# Patient Record
Sex: FEMALE | Race: WHITE | NOT HISPANIC OR LATINO
[De-identification: names, ages, dates, MRNs, and addresses within clinical notes are randomized per-mention and may not be internally consistent; named-entity substitution may affect disease eponyms.]

---

## 2019-09-11 PROBLEM — Z00.00 ENCOUNTER FOR PREVENTIVE HEALTH EXAMINATION: Status: ACTIVE | Noted: 2019-09-11

## 2019-10-02 ENCOUNTER — APPOINTMENT (OUTPATIENT)
Dept: PULMONOLOGY | Facility: CLINIC | Age: 49
End: 2019-10-02

## 2023-03-07 ENCOUNTER — APPOINTMENT (OUTPATIENT)
Dept: ORTHOPEDIC SURGERY | Facility: CLINIC | Age: 53
End: 2023-03-07
Payer: COMMERCIAL

## 2023-03-07 VITALS — HEIGHT: 68 IN | WEIGHT: 250 LBS | BODY MASS INDEX: 37.89 KG/M2

## 2023-03-07 DIAGNOSIS — M79.18 MYALGIA, OTHER SITE: ICD-10-CM

## 2023-03-07 DIAGNOSIS — J45.909 UNSPECIFIED ASTHMA, UNCOMPLICATED: ICD-10-CM

## 2023-03-07 DIAGNOSIS — M25.561 PAIN IN RIGHT KNEE: ICD-10-CM

## 2023-03-07 DIAGNOSIS — Z87.891 PERSONAL HISTORY OF NICOTINE DEPENDENCE: ICD-10-CM

## 2023-03-07 DIAGNOSIS — E78.00 PURE HYPERCHOLESTEROLEMIA, UNSPECIFIED: ICD-10-CM

## 2023-03-07 DIAGNOSIS — M25.562 PAIN IN LEFT KNEE: ICD-10-CM

## 2023-03-07 PROCEDURE — 20611 DRAIN/INJ JOINT/BURSA W/US: CPT | Mod: LT

## 2023-03-07 PROCEDURE — 99204 OFFICE O/P NEW MOD 45 MIN: CPT | Mod: 25

## 2023-03-07 PROCEDURE — 73080 X-RAY EXAM OF ELBOW: CPT | Mod: LT

## 2023-03-07 PROCEDURE — 73564 X-RAY EXAM KNEE 4 OR MORE: CPT | Mod: 50

## 2023-03-07 PROCEDURE — 73030 X-RAY EXAM OF SHOULDER: CPT | Mod: LT

## 2023-03-07 NOTE — PHYSICAL EXAM
[de-identified] : Neurologic: normal sensation, normal mood and affect, orientated and able to communicate\par Skin: no rash, no lesions\par Constitutional: well developed and well nourished\par Cardiovascular: extremities warm and well perfused\par Pulmonary: no respiratory distress \par \par Left Knee: Medial joint line tenderness\par Medial facet of patella tenderness\par Medial Tal's test positive \par \par Right Knee: Medial joint line tenderness\par Medial facet of patella tenderness\par Medial Tal's test positive \par \par X-Ray of the BILATERAL KNEES: 4 views: End stage tricompartmental osteoarthritis \par \par Left Elbow: Lateral epicondylitis

## 2023-03-07 NOTE — HISTORY OF PRESENT ILLNESS
[de-identified] : The patient is a 52 year old R hand dominant F who presents today complaining of bilateral knees, left shoulder.   \par Date of Injury/Onset: march 2022\par Pain:    At Rest: 9/10  \par With Activity:  9/10  \par Mechanism of injury: pt tripped forward, has been experiencing bilateral knee pain, left shoulder pain since.\par This is not a Work Related Injury being treated under Worker's Compensation. \par This is not an athletic injury occurring associated with an interscholastic or organized sports team. \par Quality of symptoms of the knees: L>R, locking, weakness, throbbing, shooting\par Quality of symptoms of the Left shoulder: radiating down the arm, sharp\par Improves with: nothing helps with the pain\par Worse with: activity, weight bearing, lifting, stairs \par Prior treatment: n/a\par Prior Imaging: [facility] radiology Xray  \par Out of work/sport: _, since _ \par School/Sport/Position/Occupation: full time [employer]\par Additional Information: uses a walker to assist with walking

## 2023-03-07 NOTE — DISCUSSION/SUMMARY
[de-identified] : Physical therapy prescribed for strengthening and stretching for knees + elbow\par Voltaren gel prescribed for elbow pain\par \par Bilateral Knee Ultrasound Guided aspiration/steroid injection procedure note:\par Patient Identification\par Name/: Verbal with patient and/or family\par Procedure Verification:\par Procedure confirmed with patient or family/designee\par Consent for procedure: Verbal Consent Given\par Relevant documentation completed, reviewed, and signed\par Clinical indications for procedure confirmed\par Time-out with all members of procedure team immediately prior to procedure:\par Correct patient identified. Agreement on procedure. Correct side and site.\par --\par KNEE INTRAARTICULAR INJECTION - BILATERAL\par After verbal consent and identification of the correct patient and correct site, the BILATERAL superolateral knees were prepped using alcohol swabs and betadine and were allowed time to air dry.  \par A mixture of Kenalog and Bupivacaine was injected into the BILATERAL knees using sterile 22G 1.5 inch needles.\par The patient tolerated the procedure well.  A sterile dressing was placed on each knee.  \par After-care instructions were provided and included instructions to ice the areas and to call if redness, pain, or fever develop. \par \par -----------------------------------------------\par Home Exercise\par The patient is instructed on a home exercise program.\par \par CARSON MARTINEZ Acting as a Scribe for Dr. Jenkins\par I, Carson Martinez, attest that this documentation has been prepared under the direction and in the presence of Provider Miguel Angel Jenkins MD.\par \par Activity Modification\par The patient was advised to modify their activities.\par \par Dx / Natural History\par The patient was advised of the diagnosis.  The natural history of the pathology was explained in full to the patient in layman's terms.  Several different treatment options were discussed and explained in full to the patient including the risks and benefits of both surgical and non-surgical treatments.  All questions and concerns were answered.\par \par Pain Guide Activities\par The patient was advised to let pain guide the gradual advancement of activities.\par \par RICE\par I explained to the patient that rest, ice, compression, and elevation would benefit them.  They may return to activity after follow-up or when they no longer have any pain.

## 2023-04-27 ENCOUNTER — APPOINTMENT (OUTPATIENT)
Dept: ORTHOPEDIC SURGERY | Facility: CLINIC | Age: 53
End: 2023-04-27
Payer: COMMERCIAL

## 2023-04-27 DIAGNOSIS — M17.12 UNILATERAL PRIMARY OSTEOARTHRITIS, LEFT KNEE: ICD-10-CM

## 2023-04-27 DIAGNOSIS — M17.11 UNILATERAL PRIMARY OSTEOARTHRITIS, RIGHT KNEE: ICD-10-CM

## 2023-04-27 PROCEDURE — 99215 OFFICE O/P EST HI 40 MIN: CPT

## 2023-05-26 ENCOUNTER — RESULT REVIEW (OUTPATIENT)
Age: 53
End: 2023-05-26

## 2023-06-04 ENCOUNTER — FORM ENCOUNTER (OUTPATIENT)
Age: 53
End: 2023-06-04

## 2023-06-19 NOTE — PHYSICAL EXAM
[Normal Sensation] : normal sensation [Normal Mood and Affect] : normal mood and affect [Orientated] : orientated [Able to Communicate] : able to communicate [Well Developed] : well developed [Well Nourished] : well nourished [NL (140)] : flexion 140 degrees [NL (0)] : extension 0 degrees [5___] : hamstring 5[unfilled]/5 [] : ambulation with cane [Bilateral] : knee bilaterally [advanced tricompartmental OA with medial compartment narrowing and varus alignment] : advanced tricompartmental OA with medial compartment narrowing and varus alignment [FreeTextEntry9] : bone on bone in medial compartment, subchondral sclerosis and cysts

## 2023-06-19 NOTE — ASSESSMENT
[FreeTextEntry1] : 52F p/w garrick knee OA\par \par She would like to proceed with L TKA, r b a explained to patient, we will to schedule, preop CT to eval bone loss, we discussed strategies for weight loss prior to surgery\par \par We discussed my findings and the natural history of their condition. We talked about the details of the proposed surgery and the recovery. We discussed the material risks, possible benefits and alternatives to surgery. The risks include but are not limited to infection, bleeding and possible need for blood transfusion, fracture, bowel blockage, bladder retention or infection, need for reoperation, stiffness and/or limited range of motion, possible damage to nerves and blood vessels, failure of fixation of components, risk of deep vein thromboses and pulmonary embolism, wound healing problems, dislocation, and possible leg length discrepancy. Although incredibly rare, we also discussed the risks of a cardiac event, stroke and even death during, or following, the surgery. We discussed the type of implants the patient will be receiving and the type of fixation that will be used, as well as whether a robot or computer navigation aide will be used. The patient understands they will need medical clearance and will attend a preoperative joint education class. We also discussed the type of anesthesia they will receive, and the risks associated with hospital or rehab length of stay, obesity, diabetes and smoking.\par

## 2023-07-03 ENCOUNTER — OUTPATIENT (OUTPATIENT)
Dept: OUTPATIENT SERVICES | Facility: HOSPITAL | Age: 53
LOS: 1 days | Discharge: HOME | End: 2023-07-03
Payer: COMMERCIAL

## 2023-07-03 VITALS
HEIGHT: 68 IN | RESPIRATION RATE: 18 BRPM | OXYGEN SATURATION: 98 % | DIASTOLIC BLOOD PRESSURE: 81 MMHG | WEIGHT: 269.63 LBS | SYSTOLIC BLOOD PRESSURE: 134 MMHG | HEART RATE: 60 BPM | TEMPERATURE: 99 F

## 2023-07-03 DIAGNOSIS — M17.12 UNILATERAL PRIMARY OSTEOARTHRITIS, LEFT KNEE: ICD-10-CM

## 2023-07-03 DIAGNOSIS — J45.909 UNSPECIFIED ASTHMA, UNCOMPLICATED: ICD-10-CM

## 2023-07-03 DIAGNOSIS — Z01.818 ENCOUNTER FOR OTHER PREPROCEDURAL EXAMINATION: ICD-10-CM

## 2023-07-03 LAB
A1C WITH ESTIMATED AVERAGE GLUCOSE RESULT: 5.8 % — HIGH (ref 4–5.6)
ALBUMIN SERPL ELPH-MCNC: 4 G/DL — SIGNIFICANT CHANGE UP (ref 3.3–5)
ALP SERPL-CCNC: 63 U/L — SIGNIFICANT CHANGE UP (ref 40–120)
ALT FLD-CCNC: 36 U/L — SIGNIFICANT CHANGE UP (ref 12–78)
ANION GAP SERPL CALC-SCNC: 4 MMOL/L — LOW (ref 5–17)
APTT BLD: 32.7 SEC — SIGNIFICANT CHANGE UP (ref 27.5–35.5)
AST SERPL-CCNC: 22 U/L — SIGNIFICANT CHANGE UP (ref 15–37)
BASOPHILS # BLD AUTO: 0.02 K/UL — SIGNIFICANT CHANGE UP (ref 0–0.2)
BASOPHILS NFR BLD AUTO: 0.4 % — SIGNIFICANT CHANGE UP (ref 0–2)
BILIRUB SERPL-MCNC: 0.6 MG/DL — SIGNIFICANT CHANGE UP (ref 0.2–1.2)
BLD GP AB SCN SERPL QL: SIGNIFICANT CHANGE UP
BUN SERPL-MCNC: 23 MG/DL — SIGNIFICANT CHANGE UP (ref 7–23)
CALCIUM SERPL-MCNC: 9.3 MG/DL — SIGNIFICANT CHANGE UP (ref 8.5–10.1)
CHLORIDE SERPL-SCNC: 109 MMOL/L — HIGH (ref 96–108)
CO2 SERPL-SCNC: 27 MMOL/L — SIGNIFICANT CHANGE UP (ref 22–31)
CREAT SERPL-MCNC: 0.74 MG/DL — SIGNIFICANT CHANGE UP (ref 0.5–1.3)
EGFR: 98 ML/MIN/1.73M2 — SIGNIFICANT CHANGE UP
EOSINOPHIL # BLD AUTO: 0.03 K/UL — SIGNIFICANT CHANGE UP (ref 0–0.5)
EOSINOPHIL NFR BLD AUTO: 0.6 % — SIGNIFICANT CHANGE UP (ref 0–6)
ESTIMATED AVERAGE GLUCOSE: 120 MG/DL — HIGH (ref 68–114)
GLUCOSE SERPL-MCNC: 100 MG/DL — HIGH (ref 70–99)
HCG SERPL-ACNC: <1 MIU/ML — SIGNIFICANT CHANGE UP
HCT VFR BLD CALC: 40.7 % — SIGNIFICANT CHANGE UP (ref 34.5–45)
HGB BLD-MCNC: 14.1 G/DL — SIGNIFICANT CHANGE UP (ref 11.5–15.5)
IMM GRANULOCYTES NFR BLD AUTO: 0 % — SIGNIFICANT CHANGE UP (ref 0–0.9)
INR BLD: 0.98 RATIO — SIGNIFICANT CHANGE UP (ref 0.88–1.16)
LYMPHOCYTES # BLD AUTO: 1.3 K/UL — SIGNIFICANT CHANGE UP (ref 1–3.3)
LYMPHOCYTES # BLD AUTO: 27.9 % — SIGNIFICANT CHANGE UP (ref 13–44)
MCHC RBC-ENTMCNC: 31.1 PG — SIGNIFICANT CHANGE UP (ref 27–34)
MCHC RBC-ENTMCNC: 34.6 G/DL — SIGNIFICANT CHANGE UP (ref 32–36)
MCV RBC AUTO: 89.6 FL — SIGNIFICANT CHANGE UP (ref 80–100)
MONOCYTES # BLD AUTO: 0.24 K/UL — SIGNIFICANT CHANGE UP (ref 0–0.9)
MONOCYTES NFR BLD AUTO: 5.2 % — SIGNIFICANT CHANGE UP (ref 2–14)
MRSA PCR RESULT.: SIGNIFICANT CHANGE UP
NEUTROPHILS # BLD AUTO: 3.07 K/UL — SIGNIFICANT CHANGE UP (ref 1.8–7.4)
NEUTROPHILS NFR BLD AUTO: 65.9 % — SIGNIFICANT CHANGE UP (ref 43–77)
NRBC # BLD: 0 /100 WBCS — SIGNIFICANT CHANGE UP (ref 0–0)
PLATELET # BLD AUTO: 283 K/UL — SIGNIFICANT CHANGE UP (ref 150–400)
POTASSIUM SERPL-MCNC: 3.8 MMOL/L — SIGNIFICANT CHANGE UP (ref 3.5–5.3)
POTASSIUM SERPL-SCNC: 3.8 MMOL/L — SIGNIFICANT CHANGE UP (ref 3.5–5.3)
PROT SERPL-MCNC: 7.5 GM/DL — SIGNIFICANT CHANGE UP (ref 6–8.3)
PROTHROM AB SERPL-ACNC: 11.7 SEC — SIGNIFICANT CHANGE UP (ref 10.5–13.4)
RBC # BLD: 4.54 M/UL — SIGNIFICANT CHANGE UP (ref 3.8–5.2)
RBC # FLD: 12.1 % — SIGNIFICANT CHANGE UP (ref 10.3–14.5)
S AUREUS DNA NOSE QL NAA+PROBE: SIGNIFICANT CHANGE UP
SODIUM SERPL-SCNC: 140 MMOL/L — SIGNIFICANT CHANGE UP (ref 135–145)
VIT D25+D1,25 OH+D1,25 PNL SERPL-MCNC: 45 PG/ML — SIGNIFICANT CHANGE UP (ref 19.9–79.3)
WBC # BLD: 4.66 K/UL — SIGNIFICANT CHANGE UP (ref 3.8–10.5)
WBC # FLD AUTO: 4.66 K/UL — SIGNIFICANT CHANGE UP (ref 3.8–10.5)

## 2023-07-03 PROCEDURE — 93010 ELECTROCARDIOGRAM REPORT: CPT

## 2023-07-03 NOTE — H&P PST ADULT - HISTORY OF PRESENT ILLNESS
52F pmh asthma (never intubated) c/o left knee pain 2/2 unilateral primary osteoarthritis here for PsT for scheduled robotic assisted left total knee replacement  This patient denies any fever, cough, sob, flu like symptoms or travel outside of the [place] in the past 30 days

## 2023-07-03 NOTE — H&P PST ADULT - ASSESSMENT
52F pmh asthma (never intubated) c/o left knee pain 2/2 unilateral primary osteoarthritis here for PsT for scheduled robotic assisted left total knee replacement  CAPRINI SCORE    AGE RELATED RISK FACTORS                                                       MOBILITY RELATED FACTORS  [x ] Age 41-60 years                                            (1 Point)                  [ ] Bed rest                                                        (1 Point)  [ ] Age: 61-74 years                                           (2 Points)                [ ] Plaster cast                                                   (2 Points)  [ ] Age= 75 years                                              (3 Points)                 [ ] Bed bound for more than 72 hours                   (2 Points)    DISEASE RELATED RISK FACTORS                                               GENDER SPECIFIC FACTORS  [x ] Edema in the lower extremities                       (1 Point)                  [ ] Pregnancy                                                     (1 Point)  [ ] Varicose veins                                               (1 Point)                  [ ] Post-partum < 6 weeks                                   (1 Point)             [x ] BMI > 25 Kg/m2                                            (1 Point)                  [ ] Hormonal therapy  or oral contraception            (1 Point)                 [ ] Sepsis (in the previous month)                        (1 Point)                  [ ] History of pregnancy complications  [ ] Pneumonia or serious lung disease                                               [ ] Unexplained or recurrent                       (1 Point)           (in the previous month)                               (1 Point)  [ ] Abnormal pulmonary function test                     (1 Point)                 SURGERY RELATED RISK FACTORS  [ ] Acute myocardial infarction                              (1 Point)                 [ ]  Section                                            (1 Point)  [ ] Congestive heart failure (in the previous month)  (1 Point)                 [ ] Minor surgery                                                 (1 Point)   [ ] Inflammatory bowel disease                             (1 Point)                 [ ] Arthroscopic surgery                                        (2 Points)  [ ] Central venous access                                    (2 Points)                [ ] General surgery lasting more than 45 minutes   (2 Points)       [ ] Stroke (in the previous month)                          (5 Points)               [x ] Elective arthroplasty                                        (5 Points)                                                                                                                                               HEMATOLOGY RELATED FACTORS                                                 TRAUMA RELATED RISK FACTORS  [ ] Prior episodes of VTE                                     (3 Points)                 [ ] Fracture of the hip, pelvis, or leg                       (5 Points)  [ ] Positive family history for VTE                         (3 Points)                 [ ] Acute spinal cord injury (in the previous month)  (5 Points)  [ ] Prothrombin 00878 A                                      (3 Points)                 [ ] Paralysis  (less than 1 month)                          (5 Points)  [ ] Factor V Leiden                                             (3 Points)                 [ ] Multiple Trauma within 1 month                         (5 Points)  [ ] Lupus anticoagulants                                     (3 Points)                                                           [ ] Anticardiolipin antibodies                                (3 Points)                                                       [ ] High homocysteine in the blood                      (3 Points)                                             [ ] Other congenital or acquired thrombophilia       (3 Points)                                                [ ] Heparin induced thrombocytopenia                  (3 Points)                                          Total Score [    8      ]

## 2023-07-03 NOTE — OCCUPATIONAL THERAPY INITIAL EVALUATION ADULT - NSOTDISCHREC_GEN_A_CORE
General Rehab Plan of Care  Therapeutic Recreation/Music Therapy Goal  Description  The patient and/or their representative will achieve their patient-specific goals related to the plan of care.  The patient-specific goals include:    -Pt will display an increase in mood prior to discharge.  -Pt will identify two coping skills related to music, art, or therapeutic recreation.     3/22/2019 1146 - Improving by Roya Momin  Note    Attended half hour of music therapy group. Interventions focused on improving mood and identifying positive coping skills. Pt played keyboard and listened to self-selected music on an iPod during group. Pt was quiet and kept to self. No negative behaviors noted. Pt was polite and respectful of peers and staff.         Home with home OT for home safety evaluation and to improve functional ADLs and transfers/mobility.

## 2023-07-03 NOTE — OCCUPATIONAL THERAPY INITIAL EVALUATION ADULT - ADDITIONAL COMMENTS
Pt lives with elderly mother (Who can not assist. No one available post op) in a private house with a garage entrance with no steps. Once inside, the pt has 6 steps (L handrail) to a platform and then 6 steps with a (R handrail) to reach the main floor where the bedroom and bathroom is. The pts bathroom has a tub/shower combination, fixed/retractable shower head, standard toilet seat and no grab bars. The pt The pt reports that she has a 3/1 commode and seat riser with arms at home. The pt ambulates with a cane prn and owns a rolling walker, W/C and rollator. The pt daily pain is a 5-6/10 at rest and a 10/10 with movement. The pt manages the pain with rest and Tylenol prn. The pt has no recent outpatient PT, hx of falls and has buckling sometimes. The pt wears glasses for distance, Pt lives with elderly mother (Who can not assist. No one available post op) in a private house with a garage entrance with no steps. Once inside, the pt has 6 steps (L handrail) to a platform and then 6 steps with a (R handrail) to reach the main floor where the bedroom and bathroom is. The pts bathroom has a tub/shower combination, fixed/retractable shower head, standard toilet seat and no grab bars. The pt The pt reports that she has a 3/1 commode and seat riser with arms at home. The pt ambulates with a cane prn and owns a rolling walker, W/C and rollator. The pt daily pain is a 5-6/10 at rest and a 10/10 with movement. The pt manages the pain with rest and Tylenol prn. The pt has no recent outpatient PT, hx of falls and has buckling sometimes. The pt wears glasses for distance, L handed, does not drive and has no hearing impairments.

## 2023-07-03 NOTE — OCCUPATIONAL THERAPY INITIAL EVALUATION ADULT - PERTINENT HX OF CURRENT PROBLEM, REHAB EVAL
L knee OA which impacts pts ability to perform functional tasks/transfers and mobility. Pt is scheduled for L TKR on 7/18/23.

## 2023-07-03 NOTE — H&P PST ADULT - PROBLEM SELECTOR PLAN 1
robotic assisted left total knee replacement with osmar  labs - cbc,pt/ptt,bmp,t&s,nose cx,ekg  M/C required  preop 3 day hibiclens instruction reviewed and given .instructed on if  nose cx positive use mupuricin 5 days and checklist given  take routine meds DOS with sips of water. avoid NSAID and OTC supplements. verbalized understanding  information on proper nutrition , increase protein and better food choices provided in packet   ensure clear given   Anesthesiologist to review PST labs, EKG, required clearances and optimization for surgery.

## 2023-07-12 ENCOUNTER — RESULT REVIEW (OUTPATIENT)
Age: 53
End: 2023-07-12

## 2023-07-18 ENCOUNTER — NON-APPOINTMENT (OUTPATIENT)
Age: 53
End: 2023-07-18

## 2023-07-19 PROBLEM — J45.909 UNSPECIFIED ASTHMA, UNCOMPLICATED: Chronic | Status: ACTIVE | Noted: 2023-07-03

## 2023-07-24 ENCOUNTER — TRANSCRIPTION ENCOUNTER (OUTPATIENT)
Age: 53
End: 2023-07-24

## 2023-07-24 RX ORDER — SODIUM CHLORIDE 9 MG/ML
3 INJECTION INTRAMUSCULAR; INTRAVENOUS; SUBCUTANEOUS EVERY 8 HOURS
Refills: 0 | Status: DISCONTINUED | OUTPATIENT
Start: 2023-07-25 | End: 2023-07-28

## 2023-07-24 RX ORDER — SODIUM CHLORIDE 9 MG/ML
1000 INJECTION, SOLUTION INTRAVENOUS
Refills: 0 | Status: DISCONTINUED | OUTPATIENT
Start: 2023-07-25 | End: 2023-07-28

## 2023-07-24 RX ORDER — OXYCODONE HYDROCHLORIDE 5 MG/1
10 TABLET ORAL EVERY 4 HOURS
Refills: 0 | Status: DISCONTINUED | OUTPATIENT
Start: 2023-07-25 | End: 2023-07-28

## 2023-07-24 RX ORDER — SENNA PLUS 8.6 MG/1
2 TABLET ORAL AT BEDTIME
Refills: 0 | Status: DISCONTINUED | OUTPATIENT
Start: 2023-07-25 | End: 2023-07-28

## 2023-07-24 RX ORDER — PANTOPRAZOLE SODIUM 20 MG/1
40 TABLET, DELAYED RELEASE ORAL
Refills: 0 | Status: DISCONTINUED | OUTPATIENT
Start: 2023-07-25 | End: 2023-07-28

## 2023-07-24 RX ORDER — LANOLIN ALCOHOL/MO/W.PET/CERES
3 CREAM (GRAM) TOPICAL AT BEDTIME
Refills: 0 | Status: DISCONTINUED | OUTPATIENT
Start: 2023-07-25 | End: 2023-07-28

## 2023-07-24 RX ORDER — HYDROMORPHONE HYDROCHLORIDE 2 MG/ML
0.5 INJECTION INTRAMUSCULAR; INTRAVENOUS; SUBCUTANEOUS
Refills: 0 | Status: COMPLETED | OUTPATIENT
Start: 2023-07-25 | End: 2023-08-01

## 2023-07-24 RX ORDER — OXYCODONE HYDROCHLORIDE 5 MG/1
5 TABLET ORAL EVERY 4 HOURS
Refills: 0 | Status: DISCONTINUED | OUTPATIENT
Start: 2023-07-25 | End: 2023-07-28

## 2023-07-24 RX ORDER — CELECOXIB 200 MG/1
200 CAPSULE ORAL EVERY 12 HOURS
Refills: 0 | Status: DISCONTINUED | OUTPATIENT
Start: 2023-07-26 | End: 2023-07-28

## 2023-07-24 RX ORDER — POLYETHYLENE GLYCOL 3350 17 G/17G
17 POWDER, FOR SOLUTION ORAL AT BEDTIME
Refills: 0 | Status: DISCONTINUED | OUTPATIENT
Start: 2023-07-25 | End: 2023-07-28

## 2023-07-24 RX ORDER — ALBUTEROL 90 UG/1
2 AEROSOL, METERED ORAL EVERY 6 HOURS
Refills: 0 | Status: DISCONTINUED | OUTPATIENT
Start: 2023-07-25 | End: 2023-07-28

## 2023-07-24 RX ORDER — ASCORBIC ACID 60 MG
500 TABLET,CHEWABLE ORAL
Refills: 0 | Status: DISCONTINUED | OUTPATIENT
Start: 2023-07-25 | End: 2023-07-28

## 2023-07-24 RX ORDER — ASPIRIN/CALCIUM CARB/MAGNESIUM 324 MG
81 TABLET ORAL
Refills: 0 | Status: DISCONTINUED | OUTPATIENT
Start: 2023-07-26 | End: 2023-07-28

## 2023-07-24 RX ORDER — ONDANSETRON 8 MG/1
4 TABLET, FILM COATED ORAL EVERY 6 HOURS
Refills: 0 | Status: DISCONTINUED | OUTPATIENT
Start: 2023-07-25 | End: 2023-07-28

## 2023-07-25 ENCOUNTER — INPATIENT (INPATIENT)
Facility: HOSPITAL | Age: 53
LOS: 2 days | Discharge: NURSING, REHAB OR LONG-TERM CARE | End: 2023-07-28
Attending: STUDENT IN AN ORGANIZED HEALTH CARE EDUCATION/TRAINING PROGRAM | Admitting: STUDENT IN AN ORGANIZED HEALTH CARE EDUCATION/TRAINING PROGRAM
Payer: COMMERCIAL

## 2023-07-25 ENCOUNTER — TRANSCRIPTION ENCOUNTER (OUTPATIENT)
Age: 53
End: 2023-07-25

## 2023-07-25 ENCOUNTER — APPOINTMENT (OUTPATIENT)
Dept: ORTHOPEDIC SURGERY | Facility: HOSPITAL | Age: 53
End: 2023-07-25
Payer: COMMERCIAL

## 2023-07-25 VITALS
HEIGHT: 68 IN | TEMPERATURE: 98 F | RESPIRATION RATE: 17 BRPM | WEIGHT: 250 LBS | DIASTOLIC BLOOD PRESSURE: 74 MMHG | HEART RATE: 48 BPM | SYSTOLIC BLOOD PRESSURE: 133 MMHG | OXYGEN SATURATION: 98 %

## 2023-07-25 LAB
ANION GAP SERPL CALC-SCNC: 7 MMOL/L — SIGNIFICANT CHANGE UP (ref 5–17)
BUN SERPL-MCNC: 17 MG/DL — SIGNIFICANT CHANGE UP (ref 7–23)
CALCIUM SERPL-MCNC: 8.7 MG/DL — SIGNIFICANT CHANGE UP (ref 8.5–10.1)
CHLORIDE SERPL-SCNC: 109 MMOL/L — HIGH (ref 96–108)
CO2 SERPL-SCNC: 25 MMOL/L — SIGNIFICANT CHANGE UP (ref 22–31)
CREAT SERPL-MCNC: 0.81 MG/DL — SIGNIFICANT CHANGE UP (ref 0.5–1.3)
EGFR: 88 ML/MIN/1.73M2 — SIGNIFICANT CHANGE UP
GLUCOSE SERPL-MCNC: 96 MG/DL — SIGNIFICANT CHANGE UP (ref 70–99)
HCG UR QL: NEGATIVE — SIGNIFICANT CHANGE UP
HCT VFR BLD CALC: 43.2 % — SIGNIFICANT CHANGE UP (ref 34.5–45)
HGB BLD-MCNC: 14.2 G/DL — SIGNIFICANT CHANGE UP (ref 11.5–15.5)
MCHC RBC-ENTMCNC: 30.7 PG — SIGNIFICANT CHANGE UP (ref 27–34)
MCHC RBC-ENTMCNC: 32.9 G/DL — SIGNIFICANT CHANGE UP (ref 32–36)
MCV RBC AUTO: 93.5 FL — SIGNIFICANT CHANGE UP (ref 80–100)
NRBC # BLD: 0 /100 WBCS — SIGNIFICANT CHANGE UP (ref 0–0)
PLATELET # BLD AUTO: 259 K/UL — SIGNIFICANT CHANGE UP (ref 150–400)
POTASSIUM SERPL-MCNC: 4.1 MMOL/L — SIGNIFICANT CHANGE UP (ref 3.5–5.3)
POTASSIUM SERPL-SCNC: 4.1 MMOL/L — SIGNIFICANT CHANGE UP (ref 3.5–5.3)
RBC # BLD: 4.62 M/UL — SIGNIFICANT CHANGE UP (ref 3.8–5.2)
RBC # FLD: 11.9 % — SIGNIFICANT CHANGE UP (ref 10.3–14.5)
SODIUM SERPL-SCNC: 141 MMOL/L — SIGNIFICANT CHANGE UP (ref 135–145)
WBC # BLD: 5.1 K/UL — SIGNIFICANT CHANGE UP (ref 3.8–10.5)
WBC # FLD AUTO: 5.1 K/UL — SIGNIFICANT CHANGE UP (ref 3.8–10.5)

## 2023-07-25 PROCEDURE — 27447 TOTAL KNEE ARTHROPLASTY: CPT | Mod: LT

## 2023-07-25 PROCEDURE — 20985 CPTR-ASST DIR MS PX: CPT

## 2023-07-25 PROCEDURE — 73560 X-RAY EXAM OF KNEE 1 OR 2: CPT | Mod: 26,LT

## 2023-07-25 PROCEDURE — 27447 TOTAL KNEE ARTHROPLASTY: CPT | Mod: AS,LT

## 2023-07-25 DEVICE — MAKO BONE PIN 3.2MM X 110MM: Type: IMPLANTABLE DEVICE | Site: LEFT | Status: FUNCTIONAL

## 2023-07-25 DEVICE — COMP FEM CR CMNTLSS BEADED W/ PA SZ 3 LT: Type: IMPLANTABLE DEVICE | Site: LEFT | Status: FUNCTIONAL

## 2023-07-25 DEVICE — PATELLA ASYMM TRIATHLON SZ A 32X10MM: Type: IMPLANTABLE DEVICE | Site: LEFT | Status: FUNCTIONAL

## 2023-07-25 DEVICE — MAKO BONE PIN 3.2MM X 140MM: Type: IMPLANTABLE DEVICE | Site: LEFT | Status: FUNCTIONAL

## 2023-07-25 DEVICE — INSERT TIB BEARING CS X3 SZ 4 9MM: Type: IMPLANTABLE DEVICE | Site: LEFT | Status: FUNCTIONAL

## 2023-07-25 DEVICE — BASEPLATE TIB TRIATHLON TRITAN SZ 4: Type: IMPLANTABLE DEVICE | Site: LEFT | Status: FUNCTIONAL

## 2023-07-25 RX ORDER — ACETAMINOPHEN 500 MG
650 TABLET ORAL ONCE
Refills: 0 | Status: COMPLETED | OUTPATIENT
Start: 2023-07-25 | End: 2023-07-25

## 2023-07-25 RX ORDER — CELECOXIB 200 MG/1
200 CAPSULE ORAL ONCE
Refills: 0 | Status: COMPLETED | OUTPATIENT
Start: 2023-07-25 | End: 2023-07-25

## 2023-07-25 RX ORDER — CEFAZOLIN SODIUM 1 G
2000 VIAL (EA) INJECTION EVERY 8 HOURS
Refills: 0 | Status: COMPLETED | OUTPATIENT
Start: 2023-07-25 | End: 2023-07-26

## 2023-07-25 RX ORDER — HYDROMORPHONE HYDROCHLORIDE 2 MG/ML
0.5 INJECTION INTRAMUSCULAR; INTRAVENOUS; SUBCUTANEOUS
Refills: 0 | Status: DISCONTINUED | OUTPATIENT
Start: 2023-07-25 | End: 2023-07-25

## 2023-07-25 RX ORDER — DEXAMETHASONE 0.5 MG/5ML
10 ELIXIR ORAL ONCE
Refills: 0 | Status: COMPLETED | OUTPATIENT
Start: 2023-07-26 | End: 2023-07-26

## 2023-07-25 RX ORDER — ONDANSETRON 8 MG/1
4 TABLET, FILM COATED ORAL ONCE
Refills: 0 | Status: DISCONTINUED | OUTPATIENT
Start: 2023-07-25 | End: 2023-07-25

## 2023-07-25 RX ORDER — ACETAMINOPHEN 500 MG
1000 TABLET ORAL ONCE
Refills: 0 | Status: COMPLETED | OUTPATIENT
Start: 2023-07-25 | End: 2023-07-25

## 2023-07-25 RX ORDER — SODIUM CHLORIDE 9 MG/ML
1000 INJECTION, SOLUTION INTRAVENOUS
Refills: 0 | Status: DISCONTINUED | OUTPATIENT
Start: 2023-07-25 | End: 2023-07-25

## 2023-07-25 RX ADMIN — POLYETHYLENE GLYCOL 3350 17 GRAM(S): 17 POWDER, FOR SOLUTION ORAL at 21:36

## 2023-07-25 RX ADMIN — Medication 3 MILLIGRAM(S): at 23:35

## 2023-07-25 RX ADMIN — Medication 1000 MILLIGRAM(S): at 22:10

## 2023-07-25 RX ADMIN — SODIUM CHLORIDE 3 MILLILITER(S): 9 INJECTION INTRAMUSCULAR; INTRAVENOUS; SUBCUTANEOUS at 13:26

## 2023-07-25 RX ADMIN — OXYCODONE HYDROCHLORIDE 10 MILLIGRAM(S): 5 TABLET ORAL at 21:45

## 2023-07-25 RX ADMIN — Medication 100 MILLIGRAM(S): at 21:36

## 2023-07-25 RX ADMIN — SODIUM CHLORIDE 115 MILLILITER(S): 9 INJECTION, SOLUTION INTRAVENOUS at 21:37

## 2023-07-25 RX ADMIN — CELECOXIB 200 MILLIGRAM(S): 200 CAPSULE ORAL at 13:26

## 2023-07-25 RX ADMIN — Medication 400 MILLIGRAM(S): at 21:37

## 2023-07-25 RX ADMIN — SODIUM CHLORIDE 3 MILLILITER(S): 9 INJECTION INTRAMUSCULAR; INTRAVENOUS; SUBCUTANEOUS at 21:43

## 2023-07-25 RX ADMIN — OXYCODONE HYDROCHLORIDE 10 MILLIGRAM(S): 5 TABLET ORAL at 20:45

## 2023-07-25 RX ADMIN — Medication 650 MILLIGRAM(S): at 13:24

## 2023-07-25 RX ADMIN — SODIUM CHLORIDE 115 MILLILITER(S): 9 INJECTION, SOLUTION INTRAVENOUS at 19:01

## 2023-07-25 RX ADMIN — SODIUM CHLORIDE 75 MILLILITER(S): 9 INJECTION, SOLUTION INTRAVENOUS at 16:24

## 2023-07-25 RX ADMIN — SENNA PLUS 2 TABLET(S): 8.6 TABLET ORAL at 21:36

## 2023-07-25 NOTE — PATIENT PROFILE ADULT - FALL HARM RISK - HARM RISK INTERVENTIONS
Assistance with ambulation/Assistance OOB with selected safe patient handling equipment/Communicate Risk of Fall with Harm to all staff/Discuss with provider need for PT consult/Monitor gait and stability/Provide patient with walking aids - walker, cane, crutches/Reinforce activity limits and safety measures with patient and family/Review medications for side effects contributing to fall risk/Sit up slowly, dangle for a short time, stand at bedside before walking/Tailored Fall Risk Interventions/Toileting schedule using arm’s reach rule for commode and bathroom/Use of alarms - bed, chair and/or voice tab/Visual Cue: Yellow wristband and red socks/Bed in lowest position, wheels locked, appropriate side rails in place/Call bell, personal items and telephone in reach/Instruct patient to call for assistance before getting out of bed or chair/Non-slip footwear when patient is out of bed/[place] to call system/Physically safe environment - no spills, clutter or unnecessary equipment/Purposeful Proactive Rounding/Room/bathroom lighting operational, light cord in reach

## 2023-07-25 NOTE — DISCHARGE NOTE PROVIDER - HOSPITAL COURSE
52yFemale with history of Left Knee Osteoarthritis presenting for Left TKA by Dr. Tovar on 7/25/23. Risk and benefits of surgery were explained to the patient. The patient understood and agreed to proceed with surgery. Patient underwent the procedure with no intraoperative complications. Pt was brought in stable condition to the PACU. Once stable in PACU, pt was brought to the floor. During hospital stay pt was followed by Medicine, physical therapy, Home Care during this admission. Pt is stable for discharge

## 2023-07-25 NOTE — CONSULT NOTE ADULT - SUBJECTIVE AND OBJECTIVE BOX
MALA MCCORMICK is a 52y Female s/p ROBOTIC ASSISTED LEFT TOTAL KNEE REPLACEMENT WITH URSULA      w/ h/o No pertinent past medical history    Asthma      denies any chest pain shortness of breath palpitation dizziness lightheadedness nausea vomiting fever or chills    No significant past surgical history        SH: doesnot smoke or drink at this time    penicillin (Vomiting; Nausea; Diarrhea)    albuterol    90 MICROgram(s) HFA Inhaler 2 Puff(s) Inhalation every 6 hours PRN  ascorbic acid 500 milliGRAM(s) Oral two times a day  ceFAZolin   IVPB 2000 milliGRAM(s) IV Intermittent every 8 hours  HYDROmorphone  Injectable 0.5 milliGRAM(s) IV Push every 3 hours PRN  lactated ringers. 1000 milliLiter(s) IV Continuous <Continuous>  melatonin 3 milliGRAM(s) Oral at bedtime PRN  multivitamin 1 Tablet(s) Oral daily  ondansetron Injectable 4 milliGRAM(s) IV Push every 6 hours PRN  oxyCODONE    IR 5 milliGRAM(s) Oral every 4 hours PRN  oxyCODONE    IR 10 milliGRAM(s) Oral every 4 hours PRN  pantoprazole    Tablet 40 milliGRAM(s) Oral before breakfast  polyethylene glycol 3350 17 Gram(s) Oral at bedtime  senna 2 Tablet(s) Oral at bedtime  sodium chloride 0.9% lock flush 3 milliLiter(s) IV Push every 8 hours    T(C): 36.9 (07-25-23 @ 20:48), Max: 36.9 (07-25-23 @ 20:48)  HR: 62 (07-25-23 @ 20:48) (42 - 62)  BP: 108/67 (07-25-23 @ 20:48) (91/54 - 133/74)  RR: 18 (07-25-23 @ 20:48) (12 - 18)  SpO2: 96% (07-25-23 @ 20:48) (92% - 100%)  HEENT unremarkable  neck no JVD or bruit  heart normal S1 S2 RRR no gallops or rubs  chest clear to auscultation  abd sof nontender non distended +bs  ext no calf tenderness    A/P   DVT PX  pain control  bowel regimen   wound care as per ortho  GI PX  antiemetics prn  incentive spirometer

## 2023-07-25 NOTE — DISCHARGE NOTE PROVIDER - NSDCFUSCHEDAPPT_GEN_ALL_CORE_FT
[facility]  [facility] [address] [facility]  Scheduled Appointment: 08/10/2023

## 2023-07-25 NOTE — ASU PATIENT PROFILE, ADULT - NS PRO AD INFO GIVEN Y
decline at this time/yes Dutasteride Counseling: Dustasteride Counseling:  I discussed with the patient the risks of use of dutasteride including but not limited to decreased libido, decreased ejaculate volume, and gynecomastia. Women who can become pregnant should not handle medication.  All of the patient's questions and concerns were addressed. Dutasteride Male Counseling: Dustasteride Counseling:  I discussed with the patient the risks of use of dutasteride including but not limited to decreased libido, decreased ejaculate volume, and gynecomastia. Women who can become pregnant should not handle medication.  All of the patient's questions and concerns were addressed.

## 2023-07-25 NOTE — PATIENT PROFILE ADULT - NSPRONUTRITIONRISK_GEN_A_NUR
Jenny Bustos M.D.         No indicators present

## 2023-07-25 NOTE — DISCHARGE NOTE PROVIDER - NSDCMRMEDTOKEN_GEN_ALL_CORE_FT
Albuterol (Eqv-ProAir HFA) 90 mcg/inh inhalation aerosol: 2 inhaled as needed for  bronchospasm  Multiple Vitamins oral capsule: 1 orally once a day  Tylenol 325 mg oral tablet: 2 orally as needed for  moderate pain   albuterol 90 mcg/inh inhalation aerosol: 2 puff(s) inhaled every 6 hours As needed for bronchospasm  ascorbic acid 500 mg oral tablet: 1 tab(s) orally 2 times a day  aspirin 81 mg oral delayed release tablet: 1 tab(s) orally 2 times a day  celecoxib 200 mg oral capsule: 1 cap(s) orally every 12 hours  Multiple Vitamins oral capsule: 1 orally once a day  oxyCODONE 10 mg oral tablet: 1 tab(s) orally every 4 hours As needed Severe Pain (7 - 10)  oxyCODONE 5 mg oral tablet: 1 tab(s) orally every 4 hours As needed Moderate Pain (4 - 6)  pantoprazole 40 mg oral delayed release tablet: 1 tab(s) orally once a day (before a meal)  senna leaf extract oral tablet: 2 tab(s) orally once a day (at bedtime)  Tylenol 325 mg oral tablet: 2 orally as needed for  moderate pain

## 2023-07-25 NOTE — DISCHARGE NOTE PROVIDER - NSDCFUADDINST_GEN_ALL_CORE_FT
Keep Prineo Dressing Clean, Dry and Intact. May shower with Prineo Dressing. Please do not scrub, soak, peel or pick at the prineo dressing. No creams, lotions, or oils over dressing. May shower and let water run over incision, no baths. Pat dry once out of shower. Dressing to be removed in office at follow up visit in 2 weeks.  Keep Prineo Dressing Clean, Dry and Intact. May shower with Prineo Dressing. Please do not scrub, soak, peel or pick at the prineo dressing. No creams, lotions, or oils over dressing. May shower and let water run over incision, no baths. Pat dry once out of shower. Dressing to be removed in office at follow up visit in 2 weeks.   Keep knee straight while at rest. Elevate the leg as much as possible ("toes above the nose") to help control swelling. Make sure you get up and take a brief walk every two hours to help with circulation and prevent stiffness. Incentive spirometer 10X/hour. Cryocuff to help with pain/inflammation.

## 2023-07-25 NOTE — PATIENT PROFILE ADULT - SURGICAL SITE DESCRIPTION
ED Notes by Patricia Aguilera, RN at 7/9/2021 10:58 AM     Author: Patricia Aguilera RN Service: -- Author Type: Registered Nurse    Filed: 7/9/2021 10:59 AM Date of Service: 7/9/2021 10:58 AM Status: Signed    : Patricia Aguilera RN (Registered Nurse)       Pt assisted to BR by EDT. Wheeled toBR but then able to get up and provide sample. On return, pt c/o feeling whoozy. Declines needing anything at present        Left knee

## 2023-07-25 NOTE — DISCHARGE NOTE PROVIDER - CARE PROVIDER_API CALL
[facility]  Orthopaedic Surgery  [address], [address]  [place] [address]  Phone: [phone]  Fax: [phone]  Follow Up Time:

## 2023-07-25 NOTE — ASU PATIENT PROFILE, ADULT - AS SC BRADEN MOBILITY
The order is Lexiscan Nuclear Stress Test not a treadmill stress test, patient informed. Patient verbalized understanding and will schedule the test, she will call me back once scheduled.   (4) no limitation

## 2023-07-26 LAB
ANION GAP SERPL CALC-SCNC: 6 MMOL/L — SIGNIFICANT CHANGE UP (ref 5–17)
BUN SERPL-MCNC: 16 MG/DL — SIGNIFICANT CHANGE UP (ref 7–23)
CALCIUM SERPL-MCNC: 8.4 MG/DL — LOW (ref 8.5–10.1)
CHLORIDE SERPL-SCNC: 107 MMOL/L — SIGNIFICANT CHANGE UP (ref 96–108)
CO2 SERPL-SCNC: 26 MMOL/L — SIGNIFICANT CHANGE UP (ref 22–31)
CREAT SERPL-MCNC: 0.74 MG/DL — SIGNIFICANT CHANGE UP (ref 0.5–1.3)
EGFR: 98 ML/MIN/1.73M2 — SIGNIFICANT CHANGE UP
GLUCOSE SERPL-MCNC: 149 MG/DL — HIGH (ref 70–99)
HCT VFR BLD CALC: 36.1 % — SIGNIFICANT CHANGE UP (ref 34.5–45)
HGB BLD-MCNC: 12.7 G/DL — SIGNIFICANT CHANGE UP (ref 11.5–15.5)
MCHC RBC-ENTMCNC: 32 PG — SIGNIFICANT CHANGE UP (ref 27–34)
MCHC RBC-ENTMCNC: 35.2 G/DL — SIGNIFICANT CHANGE UP (ref 32–36)
MCV RBC AUTO: 90.9 FL — SIGNIFICANT CHANGE UP (ref 80–100)
NRBC # BLD: 0 /100 WBCS — SIGNIFICANT CHANGE UP (ref 0–0)
PLATELET # BLD AUTO: 264 K/UL — SIGNIFICANT CHANGE UP (ref 150–400)
POTASSIUM SERPL-MCNC: 4.3 MMOL/L — SIGNIFICANT CHANGE UP (ref 3.5–5.3)
POTASSIUM SERPL-SCNC: 4.3 MMOL/L — SIGNIFICANT CHANGE UP (ref 3.5–5.3)
RBC # BLD: 3.97 M/UL — SIGNIFICANT CHANGE UP (ref 3.8–5.2)
RBC # FLD: 11.7 % — SIGNIFICANT CHANGE UP (ref 10.3–14.5)
SODIUM SERPL-SCNC: 139 MMOL/L — SIGNIFICANT CHANGE UP (ref 135–145)
WBC # BLD: 9.61 K/UL — SIGNIFICANT CHANGE UP (ref 3.8–10.5)
WBC # FLD AUTO: 9.61 K/UL — SIGNIFICANT CHANGE UP (ref 3.8–10.5)

## 2023-07-26 RX ORDER — PANTOPRAZOLE SODIUM 20 MG/1
1 TABLET, DELAYED RELEASE ORAL
Qty: 0 | Refills: 0 | DISCHARGE
Start: 2023-07-26

## 2023-07-26 RX ORDER — CELECOXIB 200 MG/1
1 CAPSULE ORAL
Qty: 0 | Refills: 0 | DISCHARGE
Start: 2023-07-26

## 2023-07-26 RX ORDER — HYDROMORPHONE HYDROCHLORIDE 2 MG/ML
0.5 INJECTION INTRAMUSCULAR; INTRAVENOUS; SUBCUTANEOUS
Refills: 0 | Status: DISCONTINUED | OUTPATIENT
Start: 2023-07-26 | End: 2023-07-28

## 2023-07-26 RX ORDER — OXYCODONE HYDROCHLORIDE 5 MG/1
1 TABLET ORAL
Qty: 0 | Refills: 0 | DISCHARGE
Start: 2023-07-26

## 2023-07-26 RX ORDER — ASPIRIN/CALCIUM CARB/MAGNESIUM 324 MG
1 TABLET ORAL
Qty: 0 | Refills: 0 | DISCHARGE
Start: 2023-07-26

## 2023-07-26 RX ORDER — BENZOCAINE AND MENTHOL 5; 1 G/100ML; G/100ML
1 LIQUID ORAL THREE TIMES A DAY
Refills: 0 | Status: DISCONTINUED | OUTPATIENT
Start: 2023-07-26 | End: 2023-07-28

## 2023-07-26 RX ORDER — ALBUTEROL 90 UG/1
2 AEROSOL, METERED ORAL
Qty: 0 | Refills: 0 | DISCHARGE
Start: 2023-07-26

## 2023-07-26 RX ORDER — ACETAMINOPHEN 500 MG
1000 TABLET ORAL ONCE
Refills: 0 | Status: COMPLETED | OUTPATIENT
Start: 2023-07-26 | End: 2023-07-26

## 2023-07-26 RX ORDER — ASCORBIC ACID 60 MG
1 TABLET,CHEWABLE ORAL
Qty: 0 | Refills: 0 | DISCHARGE
Start: 2023-07-26

## 2023-07-26 RX ORDER — ALBUTEROL 90 UG/1
2 AEROSOL, METERED ORAL
Refills: 0 | DISCHARGE

## 2023-07-26 RX ORDER — SENNA PLUS 8.6 MG/1
2 TABLET ORAL
Qty: 0 | Refills: 0 | DISCHARGE
Start: 2023-07-26

## 2023-07-26 RX ORDER — ACETAMINOPHEN 500 MG
1000 TABLET ORAL ONCE
Refills: 0 | Status: COMPLETED | OUTPATIENT
Start: 2023-07-26

## 2023-07-26 RX ADMIN — SODIUM CHLORIDE 3 MILLILITER(S): 9 INJECTION INTRAMUSCULAR; INTRAVENOUS; SUBCUTANEOUS at 14:47

## 2023-07-26 RX ADMIN — CELECOXIB 200 MILLIGRAM(S): 200 CAPSULE ORAL at 19:12

## 2023-07-26 RX ADMIN — Medication 400 MILLIGRAM(S): at 19:30

## 2023-07-26 RX ADMIN — HYDROMORPHONE HYDROCHLORIDE 0.5 MILLIGRAM(S): 2 INJECTION INTRAMUSCULAR; INTRAVENOUS; SUBCUTANEOUS at 06:20

## 2023-07-26 RX ADMIN — OXYCODONE HYDROCHLORIDE 10 MILLIGRAM(S): 5 TABLET ORAL at 03:30

## 2023-07-26 RX ADMIN — OXYCODONE HYDROCHLORIDE 10 MILLIGRAM(S): 5 TABLET ORAL at 22:35

## 2023-07-26 RX ADMIN — Medication 81 MILLIGRAM(S): at 18:16

## 2023-07-26 RX ADMIN — OXYCODONE HYDROCHLORIDE 10 MILLIGRAM(S): 5 TABLET ORAL at 23:35

## 2023-07-26 RX ADMIN — PANTOPRAZOLE SODIUM 40 MILLIGRAM(S): 20 TABLET, DELAYED RELEASE ORAL at 06:00

## 2023-07-26 RX ADMIN — OXYCODONE HYDROCHLORIDE 10 MILLIGRAM(S): 5 TABLET ORAL at 15:38

## 2023-07-26 RX ADMIN — OXYCODONE HYDROCHLORIDE 10 MILLIGRAM(S): 5 TABLET ORAL at 12:02

## 2023-07-26 RX ADMIN — Medication 1000 MILLIGRAM(S): at 20:00

## 2023-07-26 RX ADMIN — HYDROMORPHONE HYDROCHLORIDE 0.5 MILLIGRAM(S): 2 INJECTION INTRAMUSCULAR; INTRAVENOUS; SUBCUTANEOUS at 06:00

## 2023-07-26 RX ADMIN — Medication 500 MILLIGRAM(S): at 18:16

## 2023-07-26 RX ADMIN — SODIUM CHLORIDE 3 MILLILITER(S): 9 INJECTION INTRAMUSCULAR; INTRAVENOUS; SUBCUTANEOUS at 06:11

## 2023-07-26 RX ADMIN — CELECOXIB 200 MILLIGRAM(S): 200 CAPSULE ORAL at 06:00

## 2023-07-26 RX ADMIN — OXYCODONE HYDROCHLORIDE 10 MILLIGRAM(S): 5 TABLET ORAL at 02:32

## 2023-07-26 RX ADMIN — Medication 1 TABLET(S): at 11:04

## 2023-07-26 RX ADMIN — Medication 500 MILLIGRAM(S): at 06:00

## 2023-07-26 RX ADMIN — SODIUM CHLORIDE 3 MILLILITER(S): 9 INJECTION INTRAMUSCULAR; INTRAVENOUS; SUBCUTANEOUS at 23:20

## 2023-07-26 RX ADMIN — POLYETHYLENE GLYCOL 3350 17 GRAM(S): 17 POWDER, FOR SOLUTION ORAL at 22:35

## 2023-07-26 RX ADMIN — Medication 81 MILLIGRAM(S): at 06:00

## 2023-07-26 RX ADMIN — OXYCODONE HYDROCHLORIDE 10 MILLIGRAM(S): 5 TABLET ORAL at 16:35

## 2023-07-26 RX ADMIN — CELECOXIB 200 MILLIGRAM(S): 200 CAPSULE ORAL at 18:16

## 2023-07-26 RX ADMIN — CELECOXIB 200 MILLIGRAM(S): 200 CAPSULE ORAL at 07:00

## 2023-07-26 RX ADMIN — Medication 100 MILLIGRAM(S): at 06:11

## 2023-07-26 RX ADMIN — Medication 3 MILLIGRAM(S): at 22:35

## 2023-07-26 RX ADMIN — SODIUM CHLORIDE 115 MILLILITER(S): 9 INJECTION, SOLUTION INTRAVENOUS at 22:35

## 2023-07-26 RX ADMIN — SENNA PLUS 2 TABLET(S): 8.6 TABLET ORAL at 22:35

## 2023-07-26 RX ADMIN — Medication 102 MILLIGRAM(S): at 06:00

## 2023-07-26 RX ADMIN — OXYCODONE HYDROCHLORIDE 10 MILLIGRAM(S): 5 TABLET ORAL at 11:03

## 2023-07-26 NOTE — PHYSICAL THERAPY INITIAL EVALUATION ADULT - GENERAL OBSERVATIONS, REHAB EVAL
Pt seen A&0X4 supine in bed with L knee dressing C/D/I in NAD.
pt encountered supine, alert and oriented x4, cardiac monitor in place, ace wrap to L knee c/d/i, NAD. Pt is s/p L TKA WBAT.

## 2023-07-26 NOTE — PHYSICAL THERAPY INITIAL EVALUATION ADULT - ADDITIONAL COMMENTS
Pt lives in a pvt house with no steps to enter and 8 and 5 steps to bedroom with rails on L and R
Pt lives in a pvt house with no steps to enter and 8 and 5 steps to bedroom with rails on L and R

## 2023-07-26 NOTE — PHYSICAL THERAPY INITIAL EVALUATION ADULT - PERTINENT HX OF CURRENT PROBLEM, REHAB EVAL
Pt is a 53 yo F with hx of L knee OA, and pain
Pt lives with elderly mother (Who can not assist. No one available post op) in a private house with a garage entrance with no steps. Once inside, the pt has 6 steps (L handrail) to a platform and then 6 steps with a (R handrail) to reach the main floor where the bedroom and bathroom is. The pts bathroom has a tub/shower combination, fixed/retractable shower head, standard toilet seat and no grab bars. The pt The pt reports that she has a 3/1 commode and seat riser with arms at home. The pt ambulates with a cane prn and owns a rolling walker, W/C and rollator. The pt daily pain is a 5-6/10 at rest and a 10/10 with movement. The pt manages the pain with rest and Tylenol prn. The pt has no recent outpatient PT, hx of falls and has buckling sometimes. The pt wears glasses for distance, L handed, does not drive and has no hearing impairments.

## 2023-07-26 NOTE — PHYSICAL THERAPY INITIAL EVALUATION ADULT - ACTIVE RANGE OF MOTION EXAMINATION, REHAB EVAL
garrick. upper extremity Active ROM was WNL (within normal limits)
bilateral upper extremity Active ROM was WFL (within functional limits)/bilateral  lower extremity Active ROM was WFL (within functional limits)

## 2023-07-26 NOTE — PHYSICAL THERAPY INITIAL EVALUATION ADULT - PLANNED THERAPY INTERVENTIONS, PT EVAL
balance training/bed mobility training/gait training/ROM/strengthening
balance training/bed mobility training/gait training/strengthening/transfer training

## 2023-07-26 NOTE — PHYSICAL THERAPY INITIAL EVALUATION ADULT - CRITERIA FOR SKILLED THERAPEUTIC INTERVENTIONS
impairments found/rehab potential/therapy frequency/anticipated discharge recommendation
impairments found

## 2023-07-26 NOTE — PHYSICAL THERAPY INITIAL EVALUATION ADULT - BED MOBILITY TRAINING, PT EVAL
Pt will independently perform all aspects of bed mobility to help prevent pressure ulcers, by 1 wk
pt will be independent with bed mobility x4 weeks

## 2023-07-26 NOTE — PHYSICAL THERAPY INITIAL EVALUATION ADULT - GAIT TRAINING, PT EVAL
Pt will independently ambulate 350 feet with RW without loss of balance, by 3 weeks
pt will be able to perform ambulation without assistive device independently for distances >1200 feet x8 weeks

## 2023-07-26 NOTE — PHYSICAL THERAPY INITIAL EVALUATION ADULT - STRENGTHENING, PT EVAL
pt will increase BUE/LE strength by full grade for ambulation, ADLs, transfers x8 weeks
Patient will improve strength by 1 grade in 6-8 weeks to improve overall functional mobility including gait, transfers, bed mobility and decrease risk of falls.

## 2023-07-26 NOTE — PHYSICAL THERAPY INITIAL EVALUATION ADULT - BALANCE TRAINING, PT EVAL
pt will increase sitting and standing static and dynamic balance by full grade for safety with ambulation, ADLs and transfers x8 weeks
Independent sitting, transfers, standing and ambulation with good balance using appropriate assistive device and prevent falls.

## 2023-07-26 NOTE — OCCUPATIONAL THERAPY INITIAL EVALUATION ADULT - ADDITIONAL COMMENTS
Pre op assessment - t lives with elderly mother (Who can not assist. No one available post op) in a private house with a garage entrance with no steps. Once inside, the pt has 6 steps (L handrail) to a platform and then 6 steps with a (R handrail) to reach the main floor where the bedroom and bathroom is. The pts bathroom has a tub/shower combination, fixed/retractable shower head, standard toilet seat and no grab bars.

## 2023-07-27 RX ORDER — ACETAMINOPHEN 500 MG
1000 TABLET ORAL ONCE
Refills: 0 | Status: COMPLETED | OUTPATIENT
Start: 2023-07-27 | End: 2023-07-27

## 2023-07-27 RX ADMIN — OXYCODONE HYDROCHLORIDE 10 MILLIGRAM(S): 5 TABLET ORAL at 04:50

## 2023-07-27 RX ADMIN — CELECOXIB 200 MILLIGRAM(S): 200 CAPSULE ORAL at 05:20

## 2023-07-27 RX ADMIN — SENNA PLUS 2 TABLET(S): 8.6 TABLET ORAL at 21:23

## 2023-07-27 RX ADMIN — Medication 81 MILLIGRAM(S): at 05:20

## 2023-07-27 RX ADMIN — OXYCODONE HYDROCHLORIDE 10 MILLIGRAM(S): 5 TABLET ORAL at 12:59

## 2023-07-27 RX ADMIN — SODIUM CHLORIDE 3 MILLILITER(S): 9 INJECTION INTRAMUSCULAR; INTRAVENOUS; SUBCUTANEOUS at 13:42

## 2023-07-27 RX ADMIN — Medication 500 MILLIGRAM(S): at 05:20

## 2023-07-27 RX ADMIN — POLYETHYLENE GLYCOL 3350 17 GRAM(S): 17 POWDER, FOR SOLUTION ORAL at 21:23

## 2023-07-27 RX ADMIN — Medication 500 MILLIGRAM(S): at 17:00

## 2023-07-27 RX ADMIN — OXYCODONE HYDROCHLORIDE 10 MILLIGRAM(S): 5 TABLET ORAL at 22:23

## 2023-07-27 RX ADMIN — CELECOXIB 200 MILLIGRAM(S): 200 CAPSULE ORAL at 17:57

## 2023-07-27 RX ADMIN — Medication 400 MILLIGRAM(S): at 17:57

## 2023-07-27 RX ADMIN — OXYCODONE HYDROCHLORIDE 10 MILLIGRAM(S): 5 TABLET ORAL at 05:50

## 2023-07-27 RX ADMIN — PANTOPRAZOLE SODIUM 40 MILLIGRAM(S): 20 TABLET, DELAYED RELEASE ORAL at 05:20

## 2023-07-27 RX ADMIN — Medication 1 TABLET(S): at 12:59

## 2023-07-27 RX ADMIN — Medication 1000 MILLIGRAM(S): at 19:00

## 2023-07-27 RX ADMIN — OXYCODONE HYDROCHLORIDE 10 MILLIGRAM(S): 5 TABLET ORAL at 21:23

## 2023-07-27 RX ADMIN — OXYCODONE HYDROCHLORIDE 10 MILLIGRAM(S): 5 TABLET ORAL at 08:59

## 2023-07-27 RX ADMIN — OXYCODONE HYDROCHLORIDE 10 MILLIGRAM(S): 5 TABLET ORAL at 17:57

## 2023-07-27 RX ADMIN — OXYCODONE HYDROCHLORIDE 10 MILLIGRAM(S): 5 TABLET ORAL at 14:05

## 2023-07-27 RX ADMIN — CELECOXIB 200 MILLIGRAM(S): 200 CAPSULE ORAL at 17:00

## 2023-07-27 RX ADMIN — Medication 400 MILLIGRAM(S): at 05:19

## 2023-07-27 RX ADMIN — OXYCODONE HYDROCHLORIDE 10 MILLIGRAM(S): 5 TABLET ORAL at 10:30

## 2023-07-27 RX ADMIN — OXYCODONE HYDROCHLORIDE 10 MILLIGRAM(S): 5 TABLET ORAL at 17:00

## 2023-07-27 RX ADMIN — SODIUM CHLORIDE 3 MILLILITER(S): 9 INJECTION INTRAMUSCULAR; INTRAVENOUS; SUBCUTANEOUS at 05:31

## 2023-07-27 RX ADMIN — Medication 81 MILLIGRAM(S): at 17:00

## 2023-07-27 RX ADMIN — CELECOXIB 200 MILLIGRAM(S): 200 CAPSULE ORAL at 06:20

## 2023-07-27 RX ADMIN — Medication 1000 MILLIGRAM(S): at 05:50

## 2023-07-28 ENCOUNTER — TRANSCRIPTION ENCOUNTER (OUTPATIENT)
Age: 53
End: 2023-07-28

## 2023-07-28 VITALS
OXYGEN SATURATION: 96 % | TEMPERATURE: 98 F | SYSTOLIC BLOOD PRESSURE: 148 MMHG | RESPIRATION RATE: 18 BRPM | DIASTOLIC BLOOD PRESSURE: 85 MMHG | HEART RATE: 74 BPM

## 2023-07-28 PROCEDURE — 73560 X-RAY EXAM OF KNEE 1 OR 2: CPT | Mod: 26,LT

## 2023-07-28 RX ORDER — ACETAMINOPHEN 500 MG
1000 TABLET ORAL ONCE
Refills: 0 | Status: COMPLETED | OUTPATIENT
Start: 2023-07-28 | End: 2023-07-28

## 2023-07-28 RX ADMIN — Medication 81 MILLIGRAM(S): at 05:41

## 2023-07-28 RX ADMIN — OXYCODONE HYDROCHLORIDE 10 MILLIGRAM(S): 5 TABLET ORAL at 06:41

## 2023-07-28 RX ADMIN — OXYCODONE HYDROCHLORIDE 10 MILLIGRAM(S): 5 TABLET ORAL at 12:00

## 2023-07-28 RX ADMIN — OXYCODONE HYDROCHLORIDE 10 MILLIGRAM(S): 5 TABLET ORAL at 11:07

## 2023-07-28 RX ADMIN — CELECOXIB 200 MILLIGRAM(S): 200 CAPSULE ORAL at 05:41

## 2023-07-28 RX ADMIN — Medication 1 TABLET(S): at 11:07

## 2023-07-28 RX ADMIN — Medication 1000 MILLIGRAM(S): at 05:25

## 2023-07-28 RX ADMIN — SODIUM CHLORIDE 3 MILLILITER(S): 9 INJECTION INTRAMUSCULAR; INTRAVENOUS; SUBCUTANEOUS at 06:48

## 2023-07-28 RX ADMIN — CELECOXIB 200 MILLIGRAM(S): 200 CAPSULE ORAL at 06:48

## 2023-07-28 RX ADMIN — Medication 500 MILLIGRAM(S): at 05:41

## 2023-07-28 RX ADMIN — SODIUM CHLORIDE 3 MILLILITER(S): 9 INJECTION INTRAMUSCULAR; INTRAVENOUS; SUBCUTANEOUS at 00:13

## 2023-07-28 RX ADMIN — OXYCODONE HYDROCHLORIDE 10 MILLIGRAM(S): 5 TABLET ORAL at 05:41

## 2023-07-28 RX ADMIN — Medication 400 MILLIGRAM(S): at 04:52

## 2023-07-28 RX ADMIN — PANTOPRAZOLE SODIUM 40 MILLIGRAM(S): 20 TABLET, DELAYED RELEASE ORAL at 05:41

## 2023-07-28 NOTE — PROGRESS NOTE ADULT - PROVIDER SPECIALTY LIST ADULT
Internal Medicine
Orthopedics

## 2023-07-28 NOTE — CHART NOTE - NSCHARTNOTEFT_GEN_A_CORE
RN paged stating pt felt a pop in her knee and is worried that its out of place.    Patient seen and examined at bedside. Patient states she felt a pop while she was in the bathroom. Patient was able to ambulate to bed after but endorsed increased pain. Currently pt is resting comfortably in bed.     PE:  LLE  incision CDI  normal postop swelling  mild casa-incisional TTP  no TTP throughout remainder of knee  calves NTTP  compartments soft and compressible  SILT rad/saph/sp/dp/tib  +TA/GSC/FHL/EHL    Discussed with patient that it's possible suture popped, however, based on exam it is very unlikely that her hardware is out of place. Will re-evaluate in AM and discuss with Dr. Tovar regarding any further workup.

## 2023-07-28 NOTE — PROGRESS NOTE ADULT - SUBJECTIVE AND OBJECTIVE BOX
Patient is 52y y/o Female s/p L TKA POD#1  Patient is seen and examined at bedside.   Pt tolerated procedure well without any intra-op complications.    Pain is controlled.  Denies CP/SOB/Dizziness/N/V/D/HA.     Vital Signs Last 24 Hrs  T(C): 36.6 (26 Jul 2023 08:51), Max: 36.9 (25 Jul 2023 20:48)  T(F): 97.8 (26 Jul 2023 08:51), Max: 98.5 (25 Jul 2023 20:48)  HR: 80 (26 Jul 2023 10:30) (42 - 80)  BP: 136/73 (26 Jul 2023 10:30) (91/54 - 136/73)  BP(mean): --  RR: 18 (26 Jul 2023 10:30) (12 - 18)  SpO2: 100% (26 Jul 2023 10:30) (92% - 100%)    Parameters below as of 26 Jul 2023 10:30  Patient On (Oxygen Delivery Method): room air          PHYSICAL EXAM:  General: A&Ox3 NAD  [ ]LE: Dressing C/D/I with ACE wrap in place. Motor intact + EHL/FHL/TA/GS.  Sensation is grossly intact.  Extremity warm, compartments soft, compressible. No calf tenderness. DP 2+   []LE: Motor intact +EHL/FHL/TA/GS. Sensation is grossly intact. Extremity warm, compartments soft, compressible. No calf tenderness. DP2+    Labs:                          12.7   9.61  )-----------( 264      ( 26 Jul 2023 05:50 )             36.1       07-26    139  |  107  |  16  ----------------------------<  149<H>  4.3   |  26  |  0.74    Ca    8.4<L>      26 Jul 2023 05:50        A/P: Patient is a 52y y/o Female s/p L TKA, POD # 1  -wound care, knee extension/leg elevation, cryocuff, isometric exercises, new medications reviewed with pt  -Pain control/analgesia  -Inc spirometry reviewed with pt, demonstrated competence  -DVT prophylaxis with Venodynes/Aspirin 81 BID  -F/U AM Labs  -PT/OT/WBAT  -prophylactic Antibiotic  -medical consult  -DC planning rehab    
Post-op Check   POD#0 S/P Left TKA   52yFemale Patient seen and examined, Pain controlled  Patient Denies SOB, CP, N/V/D       PE: Left Knee/LE: Dressing C/D/I, Sensation/motor intact, DP 2+, FROM ankle/toes   B/L LE: Skin intact. +ROM hip/knee/ankle/toes. Ankle Dorsi/plantarflexion: 5/5. Calf: soft, compressible and nontender. DP/PT 2+ NVI                          14.2   5.10  )-----------( 259      ( 25 Jul 2023 16:12 )             43.2       07-25    141  |  109<H>  |  17  ----------------------------<  96  4.1   |  25  |  0.81    Ca    8.7      25 Jul 2023 16:12          A: As above   P: Pain Control       DVT Prophylaxis      Incentive spirometry      PT WBAT LLE       Isometric exercises      Discharge Planning      All the above discussed and understood by pt       Ortho to F/U 
MALA MCCORMICK is a 52y Female s/p ROBOTIC ASSISTED LEFT TOTAL KNEE REPLACEMENT WITH URSULA        denies any chest pain shortness of breath palpitation dizziness lightheadedness nausea vomiting fever or chills    T(C): 36.4 (07-28-23 @ 05:00), Max: 36.4 (07-27-23 @ 23:00)  HR: 55 (07-28-23 @ 05:00) (52 - 63)  BP: 141/82 (07-28-23 @ 05:00) (110/74 - 141/82)  RR: 18 (07-28-23 @ 05:00) (18 - 18)  SpO2: 96% (07-28-23 @ 05:00) (96% - 98%)  no jvd/bruit  s1 s2 rrr  cta  s/nt/nd  no calf tend      cont dvt px  pain control  bowel regimen  antiemetics  incentive spirometer
MALA MCCORMICK is a 52y Female s/p ROBOTIC ASSISTED LEFT TOTAL KNEE REPLACEMENT WITH URSULA        denies any chest pain shortness of breath palpitation dizziness lightheadedness nausea vomiting fever or chills    T(C): 36.5 (07-27-23 @ 05:00), Max: 36.8 (07-26-23 @ 15:53)  HR: 50 (07-27-23 @ 05:00) (50 - 64)  BP: 116/73 (07-27-23 @ 05:00) (100/67 - 116/73)  RR: 18 (07-27-23 @ 05:00) (16 - 18)  SpO2: 98% (07-27-23 @ 05:00) (95% - 98%)  no jvd/bruit  s1 s2 rrr  cta  s/nt/nd  no calf tend                        12.7   9.61  )-----------( 264      ( 26 Jul 2023 05:50 )             36.1   07-26    139  |  107  |  16  ----------------------------<  149<H>  4.3   |  26  |  0.74    Ca    8.4<L>      26 Jul 2023 05:50        cont dvt px  pain control  bowel regimen  antiemetics  incentive spirometer
MALA MCCORMICK is a 52y Female s/p ROBOTIC ASSISTED LEFT TOTAL KNEE REPLACEMENT WITH URSULA        denies any chest pain shortness of breath palpitation dizziness lightheadedness nausea vomiting fever or chills    T(C): 36.6 (07-26-23 @ 08:51), Max: 36.9 (07-25-23 @ 20:48)  HR: 80 (07-26-23 @ 10:30) (42 - 80)  BP: 136/73 (07-26-23 @ 10:30) (91/54 - 136/73)  RR: 18 (07-26-23 @ 10:30) (12 - 18)  SpO2: 100% (07-26-23 @ 10:30) (92% - 100%)  no jvd/bruit  s1 s2 rrr  cta  s/nt/nd  no calf tend                        12.7   9.61  )-----------( 264      ( 26 Jul 2023 05:50 )             36.1   07-26    139  |  107  |  16  ----------------------------<  149<H>  4.3   |  26  |  0.74    Ca    8.4<L>      26 Jul 2023 05:50        cont dvt px  pain control  bowel regimen  antiemetics  incentive spirometer
Patient is 52y y/o Female s/p L TKA POD#2  Patient is seen and examined at bedside.   Pt tolerated procedure well without any intra-op complications.    Pain is controlled.  Denies CP/SOB/Dizziness/N/V/D/HA.     Vital Signs Last 24 Hrs  T(C): 36.5 (27 Jul 2023 05:00), Max: 36.8 (26 Jul 2023 15:53)  T(F): 97.7 (27 Jul 2023 05:00), Max: 98.2 (26 Jul 2023 15:53)  HR: 62 (27 Jul 2023 10:45) (50 - 64)  BP: 116/73 (27 Jul 2023 05:00) (100/67 - 116/73)  BP(mean): --  RR: 18 (27 Jul 2023 05:00) (16 - 18)  SpO2: 98% (27 Jul 2023 05:00) (95% - 98%)    Parameters below as of 27 Jul 2023 05:00  Patient On (Oxygen Delivery Method): room air            PHYSICAL EXAM:  General: A&Ox3 NAD  LLE: Dressing C/D/I Motor intact + EHL/FHL/TA/GS.  Sensation is grossly intact.  Extremity warm, compartments soft, compressible. No calf tenderness. DP 2+   RLE: Motor intact +EHL/FHL/TA/GS. Sensation is grossly intact. Extremity warm, compartments soft, compressible. No calf tenderness. DP2+                          12.7   9.61  )-----------( 264      ( 26 Jul 2023 05:50 )             36.1     07-26    139  |  107  |  16  ----------------------------<  149<H>  4.3   |  26  |  0.74    Ca    8.4<L>      26 Jul 2023 05:50            Urinalysis Basic - ( 26 Jul 2023 05:50 )    Color: x / Appearance: x / SG: x / pH: x  Gluc: 149 mg/dL / Ketone: x  / Bili: x / Urobili: x   Blood: x / Protein: x / Nitrite: x   Leuk Esterase: x / RBC: x / WBC x   Sq Epi: x / Non Sq Epi: x / Bacteria: x            CAPILLARY BLOOD GLUCOSE                  A/P: Patient is a 52y y/o Female s/p L TKA, POD # 2  -wound care, knee extension/leg elevation, cryocuff, isometric exercises, new medications reviewed with pt  -Pain control/analgesia  -Inc spirometry reviewed with pt, demonstrated competence  -DVT prophylaxis with Venodynes/Aspirin 81 BID  -F/U AM Labs  -PT/OT/WBAT  -prophylactic Antibiotic  -medical consult  -DC planning rehab    
Patient is 52y y/o Female s/p L TKA POD#3  Patient is seen and examined at bedside.   Pt tolerated procedure well without any intra-op complications.    Pain is controlled.  Patient heard an audible pop when walking yesterday. Was able to ambulate and bear full weight  Denies CP/SOB/Dizziness/N/V/D/HA.     Vital Signs Last 24 Hrs  T(C): 36.4 (28 Jul 2023 05:00), Max: 36.4 (27 Jul 2023 23:00)  T(F): 97.6 (28 Jul 2023 05:00), Max: 97.6 (28 Jul 2023 05:00)  HR: 55 (28 Jul 2023 05:00) (52 - 63)  BP: 141/82 (28 Jul 2023 05:00) (110/74 - 141/82)  BP(mean): --  RR: 18 (28 Jul 2023 05:00) (18 - 18)  SpO2: 96% (28 Jul 2023 05:00) (96% - 98%)    Parameters below as of 28 Jul 2023 05:00  Patient On (Oxygen Delivery Method): room air          PHYSICAL EXAM:  General: A&Ox3 NAD  LLE: Dressing C/D/I. Motor intact + EHL/FHL/TA/GS. Full ROM to hip/knee/ankle. Sensation is grossly intact.  Extremity warm, compartments soft, compressible. No calf tenderness. DP 2+   RLE: Motor intact +EHL/FHL/TA/GS. Sensation is grossly intact. Extremity warm, compartments soft, compressible. No calf tenderness. DP2+        A/P: Patient is a 52y y/o Female s/p L TKA, POD # 3  -Xrays done this AM after patient heard pop- Films negative and reviewed by Dr. Tovar  -wound care, knee extension/leg elevation, cryocuff, isometric exercises, new medications reviewed with pt  -Pain control/analgesia  -Inc spirometry reviewed with pt, demonstrated competence  -DVT prophylaxis with Venodynes/Aspirin 81 BID  -F/U AM Labs  -PT/OT/WBAT  -prophylactic Antibiotic  -medical consult  -DC planning rehab

## 2023-07-28 NOTE — DISCHARGE NOTE NURSING/CASE MANAGEMENT/SOCIAL WORK - NSTRANSFERBELONGINGSRESP_GEN_A_NUR
Follow Up   Accompanied by: self    SUBJECTIVE  Meka Adams is a 29 year old female who presents for a follow up on  Cough - yesterday had a really good day - today is feeling short of breath again.      Trazodone not really helping asking about dosing.      Her prior medical history is significant for:  There is no problem list on file for this patient.                Allergies:  ALLERGIES:  Patient has no known allergies.         Current Outpatient Medications   Medication Sig   • azithromycin (Zithromax Z-Eldon) 250 MG tablet 2 tablets day one, then 1 tablet days 2-5   • predniSONE (DELTASONE) 50 MG tablet Take 1 tablet by mouth daily. Collaborating MD: Dr. Chelle Rebolledo MD   • albuterol (ACCUNEB) 0.63 MG/3ML nebulizer solution Take 3 mLs by nebulization every 6 hours as needed for Wheezing.   • albuterol 108 (90 Base) MCG/ACT inhaler Inhale 1 puff into the lungs every 4 to 6 hours as needed for Shortness of Breath or Wheezing. Collaborating MD: Dr. Chelle Rebolledo MD   • ALPRAZolam (XANAX) 0.25 MG tablet Take 1 tablet by mouth 3 times daily as needed for Sleep or Anxiety. Collaborating MD: Dr. Chelle Rebolledo MD   • traZODone (DESYREL) 50 MG tablet Take 1 tablet by mouth nightly. Collaborating MD: Dr. Chelle Rebolledo MD   • sertraline (Zoloft) 50 MG tablet Take 1 tablet by mouth daily. Collaborating MD: Dr. Chelle Rebolledo MD   • fluticasone-salmeterol (Advair Diskus) 500-50 MCG/DOSE inhaler Inhale 1 puff into the lungs 2 times daily.       ROS    Review of Systems   Constitutional: Negative.    HENT: Negative.    Respiratory: Positive for cough and shortness of breath.    Cardiovascular: Negative.    Psychiatric/Behavioral: Positive for sleep disturbance. The patient is nervous/anxious.        OBJECTIVE:  Visit Vitals  Pulse (!) 104   Temp 98 °F (36.7 °C) (Tympanic)   Resp 16   Ht 5' 7\" (1.702 m)   Wt (!) 137.9 kg (304 lb)   LMP 06/22/2021   SpO2 98%   BMI 47.61 kg/m²      Physical exam    Physical  Exam  Vitals reviewed.   Constitutional:       General: She is not in acute distress.     Appearance: She is well-developed. She is not diaphoretic.   Cardiovascular:      Rate and Rhythm: Normal rate and regular rhythm.      Heart sounds: Normal heart sounds. No murmur heard.   No friction rub. No gallop.    Pulmonary:      Effort: Pulmonary effort is normal.      Breath sounds: Normal breath sounds. No wheezing or rales.         ASSESSMENT/PLAN  1. Anxiety  Continue prn xanax, increase trazodone by 1/2 tablet nightly until sleeping.    - T3, TOTAL; Future  - FREE T4; Future  - THYROID STIMULATING HORMONE REFLEX; Future    2. Moderate persistent asthma, unspecified whether complicated  Continue current medication.  Finish zpak and start doxycycline.      3. Hypothyroidism, unspecified type  Continue current medication - check labs.    - T3, TOTAL; Future  - FREE T4; Future  - THYROID STIMULATING HORMONE REFLEX; Future    4. Routine lab draw    - URINALYSIS & REFLEX MICROSCOPY WITH CULTURE IF INDICATED; Future  - CBC WITH DIFFERENTIAL; Future  - COMPREHENSIVE MET PNL (FAST); Future  - LIPID PANEL WITH REFLEX; Future    Immunizations given today: none      See Orders:  Instructed to call if the problem worsens or does not improve within the next 24 to 48 hours.  Schedule follow-up: 1-2 months.      Provider billing: Collaborating MD: Dr. Chelle Rebolledo MD    Signed: Nat Blanchard PA-C    yes

## 2023-07-28 NOTE — DISCHARGE NOTE NURSING/CASE MANAGEMENT/SOCIAL WORK - PATIENT PORTAL LINK FT
You can access the FollowMyHealth Patient Portal offered by [facility] by registering at the following website: [email or web address]. By joining [facility]’s FollowMyHealth portal, you will also be able to view your health information using other applications (apps) compatible with our system.

## 2023-08-02 DIAGNOSIS — J45.909 UNSPECIFIED ASTHMA, UNCOMPLICATED: ICD-10-CM

## 2023-08-02 DIAGNOSIS — Z79.899 OTHER LONG TERM (CURRENT) DRUG THERAPY: ICD-10-CM

## 2023-08-02 DIAGNOSIS — M17.12 UNILATERAL PRIMARY OSTEOARTHRITIS, LEFT KNEE: ICD-10-CM

## 2023-08-02 DIAGNOSIS — Z88.0 ALLERGY STATUS TO PENICILLIN: ICD-10-CM

## 2023-08-10 ENCOUNTER — APPOINTMENT (OUTPATIENT)
Dept: ORTHOPEDIC SURGERY | Facility: CLINIC | Age: 53
End: 2023-08-10
Payer: COMMERCIAL

## 2023-08-10 VITALS — BODY MASS INDEX: 37.89 KG/M2 | WEIGHT: 250 LBS | HEIGHT: 68 IN

## 2023-08-10 PROCEDURE — 73564 X-RAY EXAM KNEE 4 OR MORE: CPT | Mod: LT

## 2023-08-10 PROCEDURE — 99024 POSTOP FOLLOW-UP VISIT: CPT

## 2023-08-10 NOTE — HISTORY OF PRESENT ILLNESS
[9] : 9 [7] : 7 [Not working due to injury] : Work status: not working due to injury [de-identified] : 52F 2 weeks s/p L TKA, struggling with pain and ROM, no doing enough PT with rehab, not allowing her to walk, in wheelchair when not in PT. [FreeTextEntry6] : numbness, weakness [de-identified] : p/t

## 2023-08-10 NOTE — PHYSICAL EXAM
[5___] : hamstring 5[unfilled]/5 [] : ligamentously stable [Left] : left knee [There are no fractures, subluxations or dislocations. No significant abnormalities are seen] : There are no fractures, subluxations or dislocations. No significant abnormalities are seen [No loss of surgical correlation. Bony alignment acceptable. Hardware in appropriate position] : No loss of surgical correlation. Bony alignment acceptable. Hardware in appropriate position [TWNoteComboBox7] : flexion 80 degrees [de-identified] : extension 5 degrees

## 2023-08-10 NOTE — ASSESSMENT
[FreeTextEntry1] : 52F 2 weeks s/p L TKA  Adjust pain regimen to rehab better ambulate more work on rom return 2 weeks  Post Op Knee: We discussed the patient's progress and they were reminded of their antibiotic prophylaxis. We discussed continued physical therapy and/or a home exercise program. Questions about their knee replacement and future follow up were answered and discussed.

## 2023-08-24 ENCOUNTER — APPOINTMENT (OUTPATIENT)
Dept: ORTHOPEDIC SURGERY | Facility: CLINIC | Age: 53
End: 2023-08-24
Payer: COMMERCIAL

## 2023-08-24 VITALS — HEIGHT: 68 IN | WEIGHT: 250 LBS | BODY MASS INDEX: 37.89 KG/M2

## 2023-08-24 PROCEDURE — 99024 POSTOP FOLLOW-UP VISIT: CPT

## 2023-08-24 NOTE — HISTORY OF PRESENT ILLNESS
[7] : 7 [Not working due to injury] : Work status: not working due to injury [de-identified] : 52F 4 weeks s/p L TKA, out of rehab, feeling much better, pain is improving, ambulating with a cane today, denies n/v/f/c [de-identified] : p/t

## 2023-08-24 NOTE — ASSESSMENT
[FreeTextEntry1] : 52F 4 weeks s/p L TKA  Begin outpt PT Progress outpt PT return 4 weeks   We discussed the patient's progress and they were reminded of their antibiotic prophylaxis. We discussed continued physical therapy and/or a home exercise program. Questions about their knee replacement and future follow up were answered and discussed.

## 2023-08-24 NOTE — PHYSICAL EXAM
[5___] : hamstring 5[unfilled]/5 [] : ligamentously stable [Left] : left knee [There are no fractures, subluxations or dislocations. No significant abnormalities are seen] : There are no fractures, subluxations or dislocations. No significant abnormalities are seen [No loss of surgical correlation. Bony alignment acceptable. Hardware in appropriate position] : No loss of surgical correlation. Bony alignment acceptable. Hardware in appropriate position [TWNoteComboBox7] : flexion 95 degrees [de-identified] : extension 5 degrees

## 2023-09-07 ENCOUNTER — APPOINTMENT (OUTPATIENT)
Dept: ORTHOPEDIC SURGERY | Facility: CLINIC | Age: 53
End: 2023-09-07
Payer: COMMERCIAL

## 2023-09-07 PROCEDURE — 99024 POSTOP FOLLOW-UP VISIT: CPT

## 2023-09-07 PROCEDURE — 73564 X-RAY EXAM KNEE 4 OR MORE: CPT | Mod: LT

## 2023-09-07 RX ORDER — OXYCODONE 10 MG/1
10 TABLET ORAL
Qty: 15 | Refills: 0 | Status: ACTIVE | COMMUNITY
Start: 2023-08-29 | End: 1900-01-01

## 2023-09-07 NOTE — ASSESSMENT
[FreeTextEntry1] : 52F 6 weeks s/p L TKA  Continue outpt PT Oxy refilled, last refill return 8 weeks   We discussed the patient's progress and they were reminded of their antibiotic prophylaxis. We discussed continued physical therapy and/or a home exercise program. Questions about their knee replacement and future follow up were answered and discussed.

## 2023-09-07 NOTE — HISTORY OF PRESENT ILLNESS
[9] : 9 [de-identified] : 52F 6 weeks s/p L TKA, working with outpt PT, pain is improving but goes up with PT, ambulating with a cane today, denies n/v/f/c [] : no [FreeTextEntry1] : left knee  [de-identified] : PT  [de-identified] : 7/25/23 [de-identified] : Left TKA

## 2023-09-07 NOTE — PHYSICAL EXAM
[5___] : hamstring 5[unfilled]/5 [Left] : left knee [There are no fractures, subluxations or dislocations. No significant abnormalities are seen] : There are no fractures, subluxations or dislocations. No significant abnormalities are seen [No loss of surgical correlation. Bony alignment acceptable. Hardware in appropriate position] : No loss of surgical correlation. Bony alignment acceptable. Hardware in appropriate position [] : healed incision [TWNoteComboBox7] : flexion 110 degrees [de-identified] : extension 5 degrees

## 2023-10-12 ENCOUNTER — APPOINTMENT (OUTPATIENT)
Dept: ORTHOPEDIC SURGERY | Facility: CLINIC | Age: 53
End: 2023-10-12
Payer: COMMERCIAL

## 2023-10-12 VITALS — WEIGHT: 250 LBS | BODY MASS INDEX: 37.89 KG/M2 | HEIGHT: 68 IN

## 2023-10-12 PROCEDURE — 99024 POSTOP FOLLOW-UP VISIT: CPT

## 2023-10-16 ENCOUNTER — NON-APPOINTMENT (OUTPATIENT)
Age: 53
End: 2023-10-16

## 2023-12-21 ENCOUNTER — APPOINTMENT (OUTPATIENT)
Dept: ORTHOPEDIC SURGERY | Facility: CLINIC | Age: 53
End: 2023-12-21
Payer: COMMERCIAL

## 2023-12-21 VITALS — BODY MASS INDEX: 37.89 KG/M2 | WEIGHT: 250 LBS | HEIGHT: 68 IN

## 2023-12-21 DIAGNOSIS — Z78.9 OTHER SPECIFIED HEALTH STATUS: ICD-10-CM

## 2023-12-21 DIAGNOSIS — Z96.652 PRESENCE OF LEFT ARTIFICIAL KNEE JOINT: ICD-10-CM

## 2023-12-21 PROCEDURE — 99213 OFFICE O/P EST LOW 20 MIN: CPT

## 2023-12-21 PROCEDURE — 73562 X-RAY EXAM OF KNEE 3: CPT | Mod: LT

## 2023-12-21 NOTE — HISTORY OF PRESENT ILLNESS
[5] : 5 [Radiating] : radiating [] : Post Surgical Visit: yes [de-identified] : 52F 5 weeks s/p L TKA, still doing HEP,  pain increased over the last week but was feeling good prior, ambulating with a cane today, denies n/v/f/c [FreeTextEntry1] : l knee [FreeTextEntry7] : foot swelling [FreeTextEntry9] : bio freeze and VICKS at night  [de-identified] : getting up after sitting too long, worse in evening

## 2023-12-21 NOTE — ASSESSMENT
[FreeTextEntry1] : 52F  5mos s.p L TKA, doing well  Continue PT Progress activities as tolerated return 12 weeks   We discussed the patient's progress and they were reminded of their antibiotic prophylaxis. We discussed continued physical therapy and/or a home exercise program. Questions about their knee replacement and future follow up were answered and discussed.

## 2023-12-21 NOTE — PHYSICAL EXAM
[5___] : hamstring 5[unfilled]/5 [] : ambulation with cane [Left] : left knee [There are no fractures, subluxations or dislocations. No significant abnormalities are seen] : There are no fractures, subluxations or dislocations. No significant abnormalities are seen [No loss of surgical correlation. Bony alignment acceptable. Hardware in appropriate position] : No loss of surgical correlation. Bony alignment acceptable. Hardware in appropriate position [TWNoteComboBox7] : flexion 120 degrees [de-identified] : extension 5 degrees

## 2023-12-28 ENCOUNTER — APPOINTMENT (OUTPATIENT)
Dept: ORTHOPEDIC SURGERY | Facility: CLINIC | Age: 53
End: 2023-12-28

## 2024-05-23 ENCOUNTER — APPOINTMENT (OUTPATIENT)
Dept: ORTHOPEDIC SURGERY | Facility: CLINIC | Age: 54
End: 2024-05-23

## 2024-09-19 ENCOUNTER — APPOINTMENT (OUTPATIENT)
Dept: ORTHOPEDIC SURGERY | Facility: CLINIC | Age: 54
End: 2024-09-19
Payer: COMMERCIAL

## 2024-09-19 VITALS — HEIGHT: 68 IN | WEIGHT: 240 LBS | BODY MASS INDEX: 36.37 KG/M2

## 2024-09-19 DIAGNOSIS — Z00.00 ENCOUNTER FOR GENERAL ADULT MEDICAL EXAMINATION W/OUT ABNORMAL FINDINGS: ICD-10-CM

## 2024-09-19 DIAGNOSIS — M17.11 UNILATERAL PRIMARY OSTEOARTHRITIS, RIGHT KNEE: ICD-10-CM

## 2024-09-19 DIAGNOSIS — Z96.652 PRESENCE OF LEFT ARTIFICIAL KNEE JOINT: ICD-10-CM

## 2024-09-19 PROCEDURE — 99214 OFFICE O/P EST MOD 30 MIN: CPT | Mod: 25

## 2024-09-19 PROCEDURE — 20610 DRAIN/INJ JOINT/BURSA W/O US: CPT | Mod: RT

## 2024-09-19 PROCEDURE — 73564 X-RAY EXAM KNEE 4 OR MORE: CPT | Mod: 50

## 2024-09-19 NOTE — HISTORY OF PRESENT ILLNESS
[5] : 5 [4] : 4 [Localized] : localized [Tightness] : tightness [Work] : work [Sleep] : sleep [Nothing helps with pain getting better] : Nothing helps with pain getting better [Walking] : walking [de-identified] : 9/19/24: 52yo F with bilateral knee pain. She is s/p L TKA 07/2023. She continues to have increased pain in the right knee.  [] : no [FreeTextEntry1] : knees [FreeTextEntry5] : pt has been in the past for both knees; had sx l knee 7-25-23 but has been buckling and rt knee pain getting worse  [FreeTextEntry6] : left knee tightness [FreeTextEntry9] : OTC [de-identified] : bending rt knee back, getting up after sitting  [de-identified] : Dr. Tovar [de-identified] : l knee 7-25-23 [de-identified] : from home

## 2024-09-19 NOTE — PHYSICAL EXAM
[5___] : hamstring 5[unfilled]/5 [] : ambulation with cane [Left] : left knee [There are no fractures, subluxations or dislocations. No significant abnormalities are seen] : There are no fractures, subluxations or dislocations. No significant abnormalities are seen [No loss of surgical correlation. Bony alignment acceptable. Hardware in appropriate position] : No loss of surgical correlation. Bony alignment acceptable. Hardware in appropriate position [TWNoteComboBox7] : flexion 110 degrees [de-identified] : extension 5 degrees

## 2024-09-19 NOTE — ASSESSMENT
[FreeTextEntry1] : 53F 1 year s.p L TKA, R knee OA  R knee CSI tolerated well Continue HEP Progress activities as tolerated return 6 weeks   We discussed the patient's progress and they were reminded of their antibiotic prophylaxis. We discussed continued physical therapy and/or a home exercise program. Questions about their knee replacement and future follow up were answered and discussed.

## 2024-09-19 NOTE — REVIEW OF SYSTEMS
[Joint Pain] : joint pain [Joint Swelling] : joint swelling [Muscle Weakness] : muscle weakness [Negative] : Heme/Lymph [de-identified] : left knee buckling

## 2024-12-19 ENCOUNTER — APPOINTMENT (OUTPATIENT)
Dept: ORTHOPEDIC SURGERY | Facility: CLINIC | Age: 54
End: 2024-12-19

## 2025-02-21 NOTE — ASU PREOP CHECKLIST - SURGICAL CONSENT
3:45 PM    Reason for Call: Phone Call/medication     Description: Patient saw Dr Troncoso today the cream hydrocortisone (WESTCORT) 0.2 % external cream needs to go to [facility] in [place]. This prescription was sent to the wrong pharmacy       Was an appointment offered for this call? No  If yes : Appointment type              Date    Preferred method for responding to this message: Telephone Call  What is your phone number ? [phone]    If we cannot reach you directly, may we leave a detailed response at the number you provided? Yes    Can this message wait until your PCP/provider returns, if available today? YES, No     done

## 2025-03-05 NOTE — OCCUPATIONAL THERAPY INITIAL EVALUATION ADULT - DIAGNOSIS, OT EVAL
3/5/25 - left pt vm - there is an order in for an XR Bone Survey. This is a walk in appt at the [facility]. No appt necessary. I mailed the order to the pt but it is not needed for the appt. I left my direct line with any questions.  
M62.81 generalized weakness, decreased ADL management and functional transfers/mobility.

## 2025-03-06 ENCOUNTER — APPOINTMENT (OUTPATIENT)
Dept: ORTHOPEDIC SURGERY | Facility: CLINIC | Age: 55
End: 2025-03-06

## 2025-03-06 DIAGNOSIS — Z96.652 PRESENCE OF LEFT ARTIFICIAL KNEE JOINT: ICD-10-CM

## 2025-03-06 PROCEDURE — 20610 DRAIN/INJ JOINT/BURSA W/O US: CPT | Mod: RT

## 2025-03-06 PROCEDURE — 73564 X-RAY EXAM KNEE 4 OR MORE: CPT | Mod: 50

## 2025-03-06 PROCEDURE — 99214 OFFICE O/P EST MOD 30 MIN: CPT | Mod: 25

## 2025-03-27 NOTE — PHYSICAL THERAPY INITIAL EVALUATION ADULT - IMPAIRED TRANSFERS: SIT/STAND, REHAB EVAL
Goal Outcome Evaluation:       Shift Summary [id number]    Admitting Diagnosis: Falls frequently [R29.6]  Closed fracture of thoracic vertebral body (H) [S22.009A]  Multifocal pneumonia [J18.9]  Sepsis without acute organ dysfunction, due to unspecified organism (H) [A41.9]   Vitals VSS on RA  Pain managed by scheduled meds   A&Ox4  Voiding BR  Mobility Ax1 GB/W  CMS Intact  GI No BM @ this shift.  Dressing Scattered bruises and wound of coccyx, Meplex intact.  IV SL  Orders Placed This Encounter      Combination Diet Regular Diet Adult     Plan: TBD, Referrals sent for Home Care PT                            impaired balance/pain/decreased ROM/decreased strength

## 2025-04-24 ENCOUNTER — APPOINTMENT (OUTPATIENT)
Dept: ORTHOPEDIC SURGERY | Facility: CLINIC | Age: 55
End: 2025-04-24

## 2025-09-18 ENCOUNTER — APPOINTMENT (OUTPATIENT)
Dept: ORTHOPEDIC SURGERY | Facility: CLINIC | Age: 55
End: 2025-09-18
Payer: COMMERCIAL

## 2025-09-18 DIAGNOSIS — Z96.652 PRESENCE OF LEFT ARTIFICIAL KNEE JOINT: ICD-10-CM

## 2025-09-18 DIAGNOSIS — M17.11 UNILATERAL PRIMARY OSTEOARTHRITIS, RIGHT KNEE: ICD-10-CM

## 2025-09-18 PROCEDURE — 73564 X-RAY EXAM KNEE 4 OR MORE: CPT | Mod: RT

## 2025-09-18 PROCEDURE — 20610 DRAIN/INJ JOINT/BURSA W/O US: CPT | Mod: RT

## 2025-09-18 PROCEDURE — 99214 OFFICE O/P EST MOD 30 MIN: CPT | Mod: 25

## (undated) DEVICE — SAW BLADE STRYKER SAGITTAL DUAL CUT 18X90X1.19MM

## (undated) DEVICE — MARKING PEN W RULER

## (undated) DEVICE — SAW BLADE STRYKER SAGITTAL EXTRA WIDE THIN SHORT

## (undated) DEVICE — CRYO/CUFF GRAVITY COOLER KNEE LARGE

## (undated) DEVICE — ELCTR GROUNDING PAD ADULT COVIDIEN

## (undated) DEVICE — WOUND IRR IRRISEPT W 0.5 CHG

## (undated) DEVICE — SYR LUER LOK 20CC

## (undated) DEVICE — DRAPE INSTRUMENT POUCH 6.75" X 11"

## (undated) DEVICE — PACK BASIC

## (undated) DEVICE — DRSG WEBRIL 6"

## (undated) DEVICE — DRAPE U 47X51" LF STERILE

## (undated) DEVICE — SUT VICRYL 2-0 27" CT-1 UNDYED

## (undated) DEVICE — ZIMMER PULSAVAC PLUS FAN KIT

## (undated) DEVICE — FRA-ESU BOVIE FORCE TRIAD T7J19717DX: Type: DURABLE MEDICAL EQUIPMENT

## (undated) DEVICE — DRAPE IOBAN 33" X 23"

## (undated) DEVICE — VENODYNE/SCD SLEEVE CALF MEDIUM

## (undated) DEVICE — SOL IRR BAG NS 0.9% 3000ML

## (undated) DEVICE — PREP SCRUB BRUSH W CHG 4%

## (undated) DEVICE — NDL HYPO SAFE 18G X 1.5" (PINK)

## (undated) DEVICE — SUCTION YANKAUER TAPERED BULBOUS NO VENT

## (undated) DEVICE — SUT VICRYL 0 27" CT-1 UNDYED

## (undated) DEVICE — MEDICATION LABELS W MARKER

## (undated) DEVICE — SUT STRATAFIX SPIRAL MONOCRYL PLUS 4-0 45CM PS-2 UNDYED

## (undated) DEVICE — DRSG DERMABOND PRINEO 22CM

## (undated) DEVICE — MIXER BONE CEMENT EVAC III

## (undated) DEVICE — DRAPE EXTREMITY 87" X 128.5"

## (undated) DEVICE — DRAPE TOWEL BLUE 17" X 24"

## (undated) DEVICE — HOOD T5 PEELAWAY

## (undated) DEVICE — GOWN XL

## (undated) DEVICE — SUT PDO 2 1/2 CIRCLE 40MM NDL 45CM

## (undated) DEVICE — WARMING BLANKET UPPER ADULT

## (undated) DEVICE — PACK TOTAL JOINT

## (undated) DEVICE — PREP CHLORAPREP HI-LITE ORANGE 26ML

## (undated) DEVICE — DRAPE 3/4 SHEET W REINFORCEMENT 56X77"